# Patient Record
Sex: FEMALE | Race: BLACK OR AFRICAN AMERICAN | NOT HISPANIC OR LATINO | Employment: FULL TIME | URBAN - METROPOLITAN AREA
[De-identification: names, ages, dates, MRNs, and addresses within clinical notes are randomized per-mention and may not be internally consistent; named-entity substitution may affect disease eponyms.]

---

## 2023-08-21 LAB
HUMAN PAPILLOMAVIRUS (HPV): NORMAL
PAP RECOMMENDATION EXT: NORMAL

## 2023-11-28 ENCOUNTER — OFFICE VISIT (OUTPATIENT)
Dept: URGENT CARE | Facility: CLINIC | Age: 43
End: 2023-11-28
Payer: COMMERCIAL

## 2023-11-28 ENCOUNTER — HOSPITAL ENCOUNTER (OUTPATIENT)
Dept: RADIOLOGY | Facility: CLINIC | Age: 43
Discharge: HOME | End: 2023-11-28
Attending: NURSE PRACTITIONER
Payer: COMMERCIAL

## 2023-11-28 VITALS
HEART RATE: 80 BPM | TEMPERATURE: 98 F | DIASTOLIC BLOOD PRESSURE: 64 MMHG | HEIGHT: 64 IN | BODY MASS INDEX: 34.66 KG/M2 | WEIGHT: 203 LBS | SYSTOLIC BLOOD PRESSURE: 138 MMHG | OXYGEN SATURATION: 97 % | RESPIRATION RATE: 20 BRPM

## 2023-11-28 DIAGNOSIS — R52 PAIN: ICD-10-CM

## 2023-11-28 DIAGNOSIS — M23.92 ACUTE INTERNAL DERANGEMENT OF LEFT KNEE: Primary | ICD-10-CM

## 2023-11-28 DIAGNOSIS — M25.569 KNEE PAIN, UNSPECIFIED CHRONICITY, UNSPECIFIED LATERALITY: ICD-10-CM

## 2023-11-28 PROCEDURE — 99204 PR OFFICE/OUTPT VISIT, NEW, LEVL IV, 45-59 MIN: ICD-10-PCS | Mod: 25,S$GLB,, | Performed by: NURSE PRACTITIONER

## 2023-11-28 PROCEDURE — 96372 THER/PROPH/DIAG INJ SC/IM: CPT | Mod: S$GLB,,, | Performed by: NURSE PRACTITIONER

## 2023-11-28 PROCEDURE — 99204 OFFICE O/P NEW MOD 45 MIN: CPT | Mod: 25,S$GLB,, | Performed by: NURSE PRACTITIONER

## 2023-11-28 PROCEDURE — 96372 PR INJECTION,THERAP/PROPH/DIAG2ST, IM OR SUBCUT: ICD-10-PCS | Mod: S$GLB,,, | Performed by: NURSE PRACTITIONER

## 2023-11-28 RX ORDER — KETOROLAC TROMETHAMINE 30 MG/ML
30 INJECTION, SOLUTION INTRAMUSCULAR; INTRAVENOUS
Status: DISCONTINUED | OUTPATIENT
Start: 2023-11-28 | End: 2023-11-28

## 2023-11-28 RX ORDER — MONTELUKAST SODIUM 10 MG/1
10 TABLET ORAL
COMMUNITY

## 2023-11-28 RX ORDER — KETOROLAC TROMETHAMINE 30 MG/ML
30 INJECTION, SOLUTION INTRAMUSCULAR; INTRAVENOUS
Status: COMPLETED | OUTPATIENT
Start: 2023-11-28 | End: 2023-11-28

## 2023-11-28 RX ORDER — NAPROXEN 500 MG/1
500 TABLET ORAL 2 TIMES DAILY PRN
Qty: 20 TABLET | Refills: 0 | Status: SHIPPED | OUTPATIENT
Start: 2023-11-28 | End: 2023-12-08

## 2023-11-28 RX ADMIN — KETOROLAC TROMETHAMINE 30 MG: 30 INJECTION, SOLUTION INTRAMUSCULAR; INTRAVENOUS at 07:11

## 2023-11-28 NOTE — LETTER
November 28, 2023      [facility]  [address], [address]  [facility] [address]  Phone: [phone]  Fax: [phone]       Patient: Cindy Alan   YOB: 1980  Date of Visit: 11/28/2023    To Whom It May Concern:    Low Alan  was at [facility] on 11/28/2023. The patient may return to work/school on 11/30/2023 or 12/01/2023 with restrictions.  Avoid prolonged standing or sitting, or walking. If you have any questions or concerns, or if I can be of further assistance, please do not hesitate to contact me.    Sincerely,          Dave Iqbal, NP

## 2023-11-29 ENCOUNTER — HOSPITAL ENCOUNTER (OUTPATIENT)
Dept: RADIOLOGY | Facility: CLINIC | Age: 43
Discharge: HOME | End: 2023-11-29
Attending: NURSE PRACTITIONER
Payer: COMMERCIAL

## 2023-11-29 PROCEDURE — 73562 XR KNEE 3 VIEW LEFT: ICD-10-PCS | Mod: LT,S$GLB,, | Performed by: RADIOLOGY

## 2023-11-29 PROCEDURE — 73562 X-RAY EXAM OF KNEE 3: CPT | Mod: LT,S$GLB,, | Performed by: RADIOLOGY

## 2023-11-29 NOTE — PATIENT INSTRUCTIONS
Elevate as much as possible   ICE OR COOL PACK 20 minutes on and off as needed for pain   Tylenol  per pkg directions as needed pain   Naproxen every 12 hrs as needed for pain inflammation   Protective splint     Referral to local ortho for further evaluation [facility] can assist with appointment scheduling    [facility] Mon-Fri 8-5pm [phone]     Return for xray services when availabel

## 2023-11-29 NOTE — PROGRESS NOTES
"Subjective:      Patient ID: Cindy Alan is a 43 y.o. female.    Vitals:  height is 5' 4" (1.626 m) and weight is 92.1 kg (203 lb). Her temperature is 97.8 °F (36.6 °C). Her blood pressure is 138/64 and her pulse is 80. Her respiration is 20 and oxygen saturation is 97%.     Chief Complaint: Knee Pain    Patient presents with Left knee pain severe for the last 2 weeks. It has been painful for awhile.  She is unaware of an injury recently.      Knee Pain   The incident occurred more than 1 week ago. The incident occurred at home. There was no injury mechanism. The pain is at a severity of 6/10. The pain is moderate. The pain has been Constant since onset. Associated symptoms include an inability to bear weight and a loss of motion. Pertinent negatives include no loss of sensation, muscle weakness, numbness or tingling. She reports no foreign bodies present. The symptoms are aggravated by weight bearing. She has tried nothing for the symptoms. The treatment provided no relief.       Musculoskeletal:  Positive for joint pain, muscle cramps and muscle ache.   Neurological:  Negative for numbness.      Objective:     Vitals:    11/28/23 1807   BP: 138/64   BP Location: Right arm   Patient Position: Sitting   BP Method: Large (Automatic)   Pulse: 80   Resp: 20   Temp: 97.8 °F (36.6 °C)   SpO2: 97%   Weight: 92.1 kg (203 lb)   Height: 5' 4" (1.626 m)       Physical Exam   Constitutional: She is oriented to person, place, and time. She appears well-developed. She is cooperative.   HENT:   Head: Normocephalic and atraumatic.   Ears:   Right Ear: Hearing and external ear normal.   Left Ear: Hearing and external ear normal.   Nose: Nose normal. No mucosal edema or nasal deformity. No epistaxis. Right sinus exhibits no maxillary sinus tenderness and no frontal sinus tenderness. Left sinus exhibits no maxillary sinus tenderness and no frontal sinus tenderness.   Mouth/Throat: Uvula is midline, oropharynx is clear and moist " and mucous membranes are normal. No trismus in the jaw. Normal dentition. No uvula swelling.   Eyes: Conjunctivae and lids are normal.   Neck: Trachea normal and phonation normal. Neck supple.   Cardiovascular: Normal rate, regular rhythm, normal heart sounds and normal pulses.   Pulmonary/Chest: Effort normal and breath sounds normal.   Abdominal: Normal appearance and bowel sounds are normal. Soft.   Musculoskeletal:         General: Signs of injury present.      Comments: Left knee + anterior infrapatellar knee pain with extension and flexion    Neurological: She is alert and oriented to person, place, and time. She exhibits normal muscle tone.   Skin: Skin is warm, dry and intact.   Psychiatric: Her speech is normal and behavior is normal. Judgment and thought content normal.   Nursing note and vitals reviewed.      Assessment:     1. Acute internal derangement of left knee    2. Pain    3. Knee pain, unspecified chronicity, unspecified laterality    XR KNEE 3 VIEW LEFT    Result Date: 11/29/2023  EXAM:  XR KNEE 3 VIEW LEFT CLINICAL HISTORY:    Left knee joint pain TECHNIQUE: 3 views of the left knee. COMPARISON: None. FINDINGS:    Bone density and architecture are normal. No acute findings.      Negative study Finalized on: 11/29/2023 2:04 PM By:  Clayton Lynch MD [facility]# [id number]      2023-11-29 14:06:52.948    [facility]     Plan:     Patient stable for discharge and home management of condition    DME applied by RT Crawford; neurovascular reassessment completed by me post application and noted nv intact      Acute internal derangement of left knee  -     KNEE BRACE FOR HOME USE  -     Discontinue: ketorolac injection 30 mg  -     ketorolac injection 30 mg  -     naproxen (NAPROSYN) 500 MG tablet; Take 1 tablet (500 mg total) by mouth 2 (two) times daily as needed (pain inflammation).  Dispense: 20 tablet; Refill: 0  -     Ambulatory referral/consult to Orthopedics    Pain  -     XR KNEE 3 VIEW LEFT; Future;  Expected date: 11/28/2023  -     Discontinue: ketorolac injection 30 mg  -     ketorolac injection 30 mg  -     naproxen (NAPROSYN) 500 MG tablet; Take 1 tablet (500 mg total) by mouth 2 (two) times daily as needed (pain inflammation).  Dispense: 20 tablet; Refill: 0  -     Ambulatory referral/consult to Orthopedics    Knee pain, unspecified chronicity, unspecified laterality  -     IMMOBILIZER FOR HOME USE      Patient Instructions   Elevate as much as possible   ICE OR COOL PACK 20 minutes on and off as needed for pain   Tylenol  per pkg directions as needed pain   Naproxen every 12 hrs as needed for pain inflammation   Protective splint     Referral to local ortho for further evaluation [facility] can assist with appointment scheduling    [facility] Mon-Fri 8-5pm [phone]

## 2023-11-29 NOTE — PROGRESS NOTES
11/29/23     Patient is 42 yo female who was seen here yesterday and advised to return today for an xray.     X-ray is negative for acute fracture or dislocation.     Patient reports no specific injury.     Exam of knee: no swelling. ROM limited by pain. No crepitus.   Patient placed in knee brace yesterday. When she arrived today it was not placed properly and did not fit her knee well. Staff changed knee brace to a knee immobilizer for improved fit.     Discussed treatment plan. She is to follow up as ordered with ortho. Treat pain with ibuprofen/tylenol. Return for any new or worsening symptoms.     Completed medical consideration form for Employee health. Patient is to return to work on 11/30/23. No restrictions advised. I did request patient be allow to limit standing/sitting so she is able to move and stretch her leg every 2 hours. Lacie Bowles, FE

## 2023-12-15 ENCOUNTER — HOSPITAL ENCOUNTER (EMERGENCY)
Facility: HOSPITAL | Age: 43
Discharge: HOME | End: 2023-12-15
Attending: EMERGENCY MEDICINE
Payer: COMMERCIAL

## 2023-12-15 VITALS
DIASTOLIC BLOOD PRESSURE: 75 MMHG | OXYGEN SATURATION: 100 % | BODY MASS INDEX: 34.84 KG/M2 | SYSTOLIC BLOOD PRESSURE: 132 MMHG | RESPIRATION RATE: 18 BRPM | HEART RATE: 88 BPM | WEIGHT: 203 LBS | TEMPERATURE: 98 F

## 2023-12-15 DIAGNOSIS — R10.13 EPIGASTRIC PAIN: Primary | ICD-10-CM

## 2023-12-15 LAB
ALBUMIN SERPL BCP-MCNC: 3.9 G/DL (ref 3.5–5.2)
ALP SERPL-CCNC: 76 U/L (ref 55–135)
ALT SERPL W/O P-5'-P-CCNC: 12 U/L (ref 10–44)
ANION GAP SERPL CALC-SCNC: 10 MMOL/L (ref 8–16)
AST SERPL-CCNC: 12 U/L (ref 10–40)
B-HCG UR QL: NEGATIVE
BASOPHILS # BLD AUTO: 0.03 K/UL (ref 0–0.2)
BASOPHILS NFR BLD: 0.4 % (ref 0–1.9)
BILIRUB SERPL-MCNC: 0.4 MG/DL (ref 0.1–1)
BILIRUB UR QL STRIP: NEGATIVE
BNP SERPL-MCNC: <10 PG/ML (ref 0–99)
BUN SERPL-MCNC: 9 MG/DL (ref 6–20)
CALCIUM SERPL-MCNC: 9.2 MG/DL (ref 8.7–10.5)
CHLORIDE SERPL-SCNC: 106 MMOL/L (ref 95–110)
CLARITY UR: CLEAR
CO2 SERPL-SCNC: 23 MMOL/L (ref 23–29)
COLOR UR: COLORLESS
CREAT SERPL-MCNC: 1 MG/DL (ref 0.5–1.4)
DIFFERENTIAL METHOD: NORMAL
EOSINOPHIL # BLD AUTO: 0.2 K/UL (ref 0–0.5)
EOSINOPHIL NFR BLD: 2.7 % (ref 0–8)
ERYTHROCYTE [DISTWIDTH] IN BLOOD BY AUTOMATED COUNT: 12 % (ref 11.5–14.5)
EST. GFR  (NO RACE VARIABLE): >60 ML/MIN/1.73 M^2
GLUCOSE SERPL-MCNC: 118 MG/DL (ref 70–110)
GLUCOSE UR QL STRIP: NEGATIVE
HCT VFR BLD AUTO: 38.5 % (ref 37–48.5)
HGB BLD-MCNC: 12.7 G/DL (ref 12–16)
HGB UR QL STRIP: NEGATIVE
IMM GRANULOCYTES # BLD AUTO: 0.02 K/UL (ref 0–0.04)
IMM GRANULOCYTES NFR BLD AUTO: 0.2 % (ref 0–0.5)
KETONES UR QL STRIP: NEGATIVE
LEUKOCYTE ESTERASE UR QL STRIP: NEGATIVE
LIPASE SERPL-CCNC: 20 U/L (ref 4–60)
LYMPHOCYTES # BLD AUTO: 3.2 K/UL (ref 1–4.8)
LYMPHOCYTES NFR BLD: 37.3 % (ref 18–48)
MCH RBC QN AUTO: 28.7 PG (ref 27–31)
MCHC RBC AUTO-ENTMCNC: 33 G/DL (ref 32–36)
MCV RBC AUTO: 87 FL (ref 82–98)
MONOCYTES # BLD AUTO: 0.6 K/UL (ref 0.3–1)
MONOCYTES NFR BLD: 7.1 % (ref 4–15)
NEUTROPHILS # BLD AUTO: 4.5 K/UL (ref 1.8–7.7)
NEUTROPHILS NFR BLD: 52.3 % (ref 38–73)
NITRITE UR QL STRIP: NEGATIVE
NRBC BLD-RTO: 0 /100 WBC
PH UR STRIP: 8 [PH] (ref 5–8)
PLATELET # BLD AUTO: 225 K/UL (ref 150–450)
PMV BLD AUTO: 11.3 FL (ref 9.2–12.9)
POTASSIUM SERPL-SCNC: 4.1 MMOL/L (ref 3.5–5.1)
PROT SERPL-MCNC: 7.1 G/DL (ref 6–8.4)
PROT UR QL STRIP: NEGATIVE
RBC # BLD AUTO: 4.43 M/UL (ref 4–5.4)
SODIUM SERPL-SCNC: 139 MMOL/L (ref 136–145)
SP GR UR STRIP: 1.01 (ref 1–1.03)
TROPONIN I SERPL DL<=0.01 NG/ML-MCNC: <0.006 NG/ML (ref 0–0.03)
TROPONIN I SERPL DL<=0.01 NG/ML-MCNC: <0.006 NG/ML (ref 0–0.03)
URN SPEC COLLECT METH UR: ABNORMAL
UROBILINOGEN UR STRIP-ACNC: NEGATIVE EU/DL
WBC # BLD AUTO: 8.55 K/UL (ref 3.9–12.7)

## 2023-12-15 PROCEDURE — 81025 URINE PREGNANCY TEST: CPT | Performed by: EMERGENCY MEDICINE

## 2023-12-15 PROCEDURE — 83880 ASSAY OF NATRIURETIC PEPTIDE: CPT | Performed by: EMERGENCY MEDICINE

## 2023-12-15 PROCEDURE — 84484 ASSAY OF TROPONIN QUANT: CPT | Mod: 91 | Performed by: EMERGENCY MEDICINE

## 2023-12-15 PROCEDURE — 93010 EKG 12-LEAD: ICD-10-PCS | Mod: ,,, | Performed by: INTERNAL MEDICINE

## 2023-12-15 PROCEDURE — 80053 COMPREHEN METABOLIC PANEL: CPT | Performed by: EMERGENCY MEDICINE

## 2023-12-15 PROCEDURE — 81003 URINALYSIS AUTO W/O SCOPE: CPT | Performed by: EMERGENCY MEDICINE

## 2023-12-15 PROCEDURE — 93010 ELECTROCARDIOGRAM REPORT: CPT | Mod: ,,, | Performed by: INTERNAL MEDICINE

## 2023-12-15 PROCEDURE — 93005 ELECTROCARDIOGRAM TRACING: CPT

## 2023-12-15 PROCEDURE — 85025 COMPLETE CBC W/AUTO DIFF WBC: CPT | Performed by: EMERGENCY MEDICINE

## 2023-12-15 PROCEDURE — 99285 EMERGENCY DEPT VISIT HI MDM: CPT | Mod: 25

## 2023-12-15 PROCEDURE — 83690 ASSAY OF LIPASE: CPT | Performed by: EMERGENCY MEDICINE

## 2023-12-15 NOTE — ED PROVIDER NOTES
Encounter Date: 12/15/2023  SCRIBE #1 NOTE: I, Rama Palacios, am scribing for, and in the presence of, Farhat Roper DO. I have scribed the MDM.          History     Chief Complaint   Patient presents with    Dizziness     Dizziness, epigastric pain, sweating     Patient presents with epigastric abdominal pain along with that began 20 minutes prior to arrival but has resolved at this time.  She denies any chest pain/pressure/tightness.  She denies any radiating pain.  Patient denies any nausea / vomiting / diarrhea.  She denies any fevers/ chills.  She denies any cough or congestion besides what she usually experiences with her chronic rhinitis.        Review of patient's allergies indicates:   Allergen Reactions    Neomycin Swelling    Penicillin Swelling    Cefdinir      Other Reaction(s): ABDOMEN PAIN    Reports abdominal pain    Adhesive tape-silicones     Neomycin-polymyxin b-dexameth     Amoxicillin Rash    Azithromycin Rash    Latex, natural rubber Rash     Past Medical History:   Diagnosis Date    Asthma      No past surgical history on file.  No family history on file.  Social History     Tobacco Use    Smoking status: Never    Smokeless tobacco: Never   Substance Use Topics    Alcohol use: Not Currently    Drug use: Never     Review of Systems   Gastrointestinal:  Positive for abdominal pain.       Physical Exam     Initial Vitals   BP Pulse Resp Temp SpO2   12/15/23 0940 12/15/23 0940 12/15/23 0940 12/15/23 1102 12/15/23 0940   (!) 164/68 68 (!) 23 97.8 °F (36.6 °C) 100 %      MAP       --                Physical Exam    Vitals reviewed.  Constitutional: She appears well-developed.   Cardiovascular:  Normal rate and regular rhythm.           No murmur heard.  Pulmonary/Chest: Breath sounds normal. She has no wheezes.   Abdominal: Abdomen is soft.    There is no tenderness to palpation to all 4 quadrants of the abdomen no rigidity no distention   Musculoskeletal:         General: Normal range of  motion.     Neurological: She is alert and oriented to person, place, and time.   Skin: Skin is warm and dry. No rash noted.         ED Course   Procedures  Labs Reviewed   COMPREHENSIVE METABOLIC PANEL - Abnormal; Notable for the following components:       Result Value    Glucose 118 (*)     All other components within normal limits   URINALYSIS, REFLEX TO URINE CULTURE - Abnormal; Notable for the following components:    Color, UA Colorless (*)     All other components within normal limits    Narrative:     Specimen Source->Urine   CBC W/ AUTO DIFFERENTIAL   TROPONIN I   B-TYPE NATRIURETIC PEPTIDE   PREGNANCY TEST, URINE RAPID    Narrative:     Specimen Source->Urine   LIPASE   TROPONIN I     EKG Readings: (Independently Interpreted)    Sinus rhythm rate of 75, left axis, nonspecific T-wave abnormalities    EKG #2  shows normal sinus rhythm rate of 83, left axis, nonspecific T-wave abnormalities     ECG Results              EKG 12-lead (Final result)  Result time 12/15/23 16:56:21      Final result by Interface, Lab In [facility] (12/15/23 16:56:21)                   Narrative:    Test Reason : R10.13,    Vent. Rate : 083 BPM     Atrial Rate : 083 BPM     P-R Int : 170 ms          QRS Dur : 084 ms      QT Int : 352 ms       P-R-T Axes : 043 -04 011 degrees     QTc Int : 413 ms    Normal sinus rhythm  Normal ECG  When compared with ECG of 15-DEC-2023 10:29,  No significant change was found  Confirmed by ALEXIS ANDREW MD ([id number]) on 12/15/2023 4:56:16 PM    Referred By: [email or web address]   SELF           Confirmed By:ALEXIS ANDREW MD                                  Imaging Results              X-Ray Chest AP Portable (Final result)  Result time 12/15/23 11:10:47      Final result by Monroe Phillip MD (12/15/23 11:10:47)                   Impression:      No acute findings.      Electronically signed by: Monroe Phillip MD  Date:    12/15/2023  Time:    11:10               Narrative:    EXAMINATION:  XR CHEST AP PORTABLE    CLINICAL  HISTORY:  epigastric pain;    TECHNIQUE:  Single frontal view of the chest was performed.    COMPARISON:  None    FINDINGS:  The cardiomediastinal silhouette is normal.    The lungs are clear.  No pleural effusions.    No acute osseous findings.  No advanced arthritic changes.                                           Medications - No data to display  Medical Decision Making  Patient has 2 EKGs that show no acute signs of ischemia along with 2- troponins.  Her symptoms are completely resolved.  She is able to tolerate fluids.  She is able to ambulate around the department with no feelings of dizziness or unsteadiness on her feet.  She is instructed to return immediately for any new or worsening symptoms and she verbalized understanding.    Amount and/or Complexity of Data Reviewed  Labs: ordered. Decision-making details documented in ED Course.  Radiology: ordered. Decision-making details documented in ED Course.  ECG/medicine tests: ordered. Decision-making details documented in ED Course.  Discussion of management or test interpretation with external provider(s): 2:18 PM: Reassessed pt at this time. Discussed with pt all pertinent ED information and results. Pt's EKG is unchanged and troponin is negative. Discussed pt dx and plan of tx. Gave pt all f/u and return to the ED instructions. All questions and concerns were addressed at this time. Pt expresses understanding of information and instructions, and is comfortable with plan to discharge. Pt is stable for discharge.    I discussed with patient and/or family/caretaker that evaluation in the ED does not suggest any emergent or life threatening medical conditions requiring immediate intervention beyond what was provided in the ED, and I believe patient is safe for discharge.  Regardless, an unremarkable evaluation in the ED does not preclude the development or presence of a serious of life threatening condition. As such, patient was instructed to return  immediately for any worsening or change in current symptoms.        Risk  Prescription drug management.  Risk Details: Differential diagnosis includes the following but is not limited to: Dehydration, electrolyte abnormality, arrhythmia, infection, STEMI, NSTEMI, UTI, pancreatitis, cholecystitis, GERD, peptic ulcer disease, gastritis            Scribe Attestation:   Scribe #1: I performed the above scribed service and the documentation accurately describes the services I performed. I attest to the accuracy of the note.    Attending Attestation:           Physician Attestation for Scribe:  Physician Attestation Statement for Scribe #1: I, Farhat Roper DO, reviewed documentation, as scribed by Rama Palacios in my presence, and it is both accurate and complete.             ED Course as of 12/16/23 1020   Fri Dec 15, 2023   1057 CBC auto differential   Within normal limits [CD]   1115 Urinalysis, Reflex to Urine Culture Urine, Clean Catch(!)   No UTI [CD]   1115 Comprehensive metabolic panel(!)   hyperglycemia [CD]   1115 Troponin I #1   Within normal limits [CD]   1115 BNP   Within normal limits [CD]   1115 Lipase   Within normal limits [CD]   1122 X-Ray Chest AP Portable    No acute findings [CD]   1416 Troponin I  wnl [CD]      ED Course User Index  [CD] Farhat Roper DO                         Disposition: Discharged  Condition: Stable    Clinical Impression:  Final diagnoses:  [R10.13] Epigastric pain (Primary)          ED Disposition Condition    Discharge Stable          ED Prescriptions    None       Follow-up Information       Follow up With Specialties Details Why Contact Info    Jake Cedillo Jr., MD Family Medicine Schedule an appointment as soon as possible for a visit   [address] [place] [phone]               Farhat oRper DO  12/16/23 1023

## 2023-12-23 ENCOUNTER — OFFICE VISIT (OUTPATIENT)
Dept: URGENT CARE | Facility: CLINIC | Age: 43
End: 2023-12-23
Payer: COMMERCIAL

## 2023-12-23 VITALS
HEIGHT: 64 IN | RESPIRATION RATE: 18 BRPM | DIASTOLIC BLOOD PRESSURE: 85 MMHG | WEIGHT: 203 LBS | TEMPERATURE: 98 F | BODY MASS INDEX: 34.66 KG/M2 | HEART RATE: 92 BPM | SYSTOLIC BLOOD PRESSURE: 130 MMHG | OXYGEN SATURATION: 97 %

## 2023-12-23 DIAGNOSIS — J10.1 INFLUENZA B: ICD-10-CM

## 2023-12-23 DIAGNOSIS — R50.9 FEVER, UNSPECIFIED FEVER CAUSE: Primary | ICD-10-CM

## 2023-12-23 PROBLEM — I10 PRIMARY HYPERTENSION: Status: ACTIVE | Noted: 2023-08-23

## 2023-12-23 LAB
CTP QC/QA: YES
POC MOLECULAR INFLUENZA A AGN: NEGATIVE
POC MOLECULAR INFLUENZA B AGN: POSITIVE

## 2023-12-23 PROCEDURE — 87502 INFLUENZA DNA AMP PROBE: CPT | Mod: QW,S$GLB,,

## 2023-12-23 PROCEDURE — 87502 POCT INFLUENZA A/B MOLECULAR: ICD-10-PCS | Mod: QW,S$GLB,,

## 2023-12-23 PROCEDURE — 99213 OFFICE O/P EST LOW 20 MIN: CPT | Mod: S$GLB,,,

## 2023-12-23 PROCEDURE — 99213 PR OFFICE/OUTPT VISIT, EST, LEVL III, 20-29 MIN: ICD-10-PCS | Mod: S$GLB,,,

## 2023-12-23 RX ORDER — BENZONATATE 100 MG/1
100 CAPSULE ORAL 3 TIMES DAILY PRN
Qty: 30 CAPSULE | Refills: 0 | Status: SHIPPED | OUTPATIENT
Start: 2023-12-23 | End: 2024-01-02

## 2023-12-23 RX ORDER — AZELASTINE 1 MG/ML
1 SPRAY, METERED NASAL 2 TIMES DAILY
Qty: 30 ML | Refills: 0 | Status: SHIPPED | OUTPATIENT
Start: 2023-12-23 | End: 2024-12-22

## 2023-12-23 RX ORDER — AMLODIPINE BESYLATE 5 MG/1
5 TABLET ORAL
COMMUNITY

## 2023-12-23 NOTE — PROGRESS NOTES
"Subjective:      Patient ID: Cindy Alan is a 43 y.o. female.    Vitals:  height is 5' 4" (1.626 m) and weight is 92.1 kg (203 lb). Her oral temperature is 98.2 °F (36.8 °C). Her blood pressure is 130/85 and her pulse is 92. Her respiration is 18 and oxygen saturation is 97%.     Chief Complaint: Cough    42yo female patient presents to the clinic with cough, sinus pressure, and fever. Pt report symptom onset 3 days ago. Patient reports she had a 101 temp last night. Patient has taken allegra, otc oral decongestant, singular, Flonase  .     Cough  This is a new problem. The current episode started in the past 7 days. The cough is Productive of sputum. Associated symptoms include a fever and rhinorrhea. Pertinent negatives include no chest pain, chills, ear pain, sore throat, shortness of breath or wheezing.       Constitution: Positive for fever. Negative for chills.   HENT:  Positive for congestion and sinus pressure. Negative for ear pain and sore throat.    Cardiovascular:  Negative for chest pain.   Respiratory:  Positive for cough. Negative for shortness of breath and wheezing.    Neurological:  Negative for dizziness and light-headedness.      Objective:     Physical Exam   Constitutional: She is oriented to person, place, and time. She appears well-developed. She is cooperative.  Non-toxic appearance. She does not appear ill. No distress.   HENT:   Head: Normocephalic and atraumatic.   Ears:   Right Ear: Hearing, tympanic membrane, external ear and ear canal normal.   Left Ear: Hearing, tympanic membrane, external ear and ear canal normal.   Nose: Congestion present. No mucosal edema or rhinorrhea. Right sinus exhibits no maxillary sinus tenderness and no frontal sinus tenderness. Left sinus exhibits no maxillary sinus tenderness and no frontal sinus tenderness.   Mouth/Throat: Uvula is midline and mucous membranes are normal. No trismus in the jaw. No uvula swelling. No oropharyngeal exudate, posterior " oropharyngeal edema or posterior oropharyngeal erythema.   Eyes: Lids are normal.   Neck: Trachea normal and phonation normal. Neck supple. No edema present. No erythema present.   Cardiovascular: Normal rate, regular rhythm, normal heart sounds and normal pulses.   No murmur heard.  Pulmonary/Chest: Effort normal and breath sounds normal. No respiratory distress. She has no decreased breath sounds. She has no wheezes. She has no rhonchi.   Abdominal: Normal appearance.   Musculoskeletal: Normal range of motion.         General: Normal range of motion.   Lymphadenopathy:     She has no cervical adenopathy.   Neurological: She is alert and oriented to person, place, and time. She exhibits normal muscle tone.   Skin: Skin is warm, dry, intact and not diaphoretic.   Psychiatric: Her speech is normal and behavior is normal.   Nursing note and vitals reviewed.    Results for orders placed or performed in visit on 12/23/23   POCT Influenza A/B MOLECULAR   Result Value Ref Range    POC Molecular Influenza A Ag Negative Negative, Not Reported    POC Molecular Influenza B Ag Positive (A) Negative, Not Reported     Acceptable Yes          Assessment:     1. Fever, unspecified fever cause    2. Influenza B        Plan:       Fever, unspecified fever cause  -     POCT Influenza A/B MOLECULAR  -     benzonatate (TESSALON) 100 MG capsule; Take 1 capsule (100 mg total) by mouth 3 (three) times daily as needed for Cough.  Dispense: 30 capsule; Refill: 0  -     azelastine (ASTELIN) 137 mcg (0.1 %) nasal spray; 1 spray (137 mcg total) by Nasal route 2 (two) times daily.  Dispense: 30 mL; Refill: 0    Influenza B    Patient in no acute distress. Vitals reassuring. Non-toxic appearing. Reviewed positive flu B test results in detail. Discussed OTC medications for symptom relief. Discussed pt out of timeframe for antivirals. Discussed prescription for Tessalon Perles and astelin. Discussed the importance of further  evaluation if symptoms worsen. Patient stated verbal understanding.    Patient Instructions   Tested positive for Influenza B today. Take Tylenol/ibuprofen with food for fevers/pain. Drink plenty of fluids and get plenty of rest. Do not return to school/work until 24 hour fever free without the use of tylenol/ibuprofen. If your symptoms worsen please return or go to the ER for further evaluation.       Patient Instructions   PLEASE READ YOUR DISCHARGE INSTRUCTIONS ENTIRELY AS IT CONTAINS IMPORTANT INFORMATION.     Please drink plenty of fluids.     Please get plenty of rest.     Please return here or go to the Emergency Department for any concerns or worsening of condition.     Please take an over the counter antihistamine medication (allegra/Claritin/Zyrtec) of your choice as directed.       If you do have Hypertension or palpitations, it is safe to take Coricidin HBP for relief of sinus symptoms.     If not allergic, please take over the counter Tylenol (Acetaminophen) and/or Motrin (Ibuprofen) as directed for control of pain and/or fever.  Please follow up with your primary care doctor or specialist as needed.     Sore throat recommendations: Warm fluids, warm salt water gargles, throat lozenges, tea, honey, soup, rest, hydration.     Use over the counter flonase: one spray each nostril twice daily OR two sprays each nostril once daily.      If you  smoke, please stop smoking.     Please return or see your primary care doctor if you develop new or worsening symptoms.      Please arrange follow up with your primary medical clinic as soon as possible. You must understand that you've received an Urgent Care treatment only and that you may be released before all of your medical problems are known or treated. You, the patient, will arrange for follow up as instructed. If your symptoms worsen or fail to improve you should go to the Emergency Room.

## 2023-12-23 NOTE — PATIENT INSTRUCTIONS
Tested positive for Influenza B today. Take Tylenol/ibuprofen with food for fevers/pain. Drink plenty of fluids and get plenty of rest. Do not return to school/work until 24 hour fever free without the use of tylenol/ibuprofen. If your symptoms worsen please return or go to the ER for further evaluation.       Patient Instructions   PLEASE READ YOUR DISCHARGE INSTRUCTIONS ENTIRELY AS IT CONTAINS IMPORTANT INFORMATION.     Please drink plenty of fluids.     Please get plenty of rest.     Please return here or go to the Emergency Department for any concerns or worsening of condition.     Please take an over the counter antihistamine medication (allegra/Claritin/Zyrtec) of your choice as directed.       If you do have Hypertension or palpitations, it is safe to take Coricidin HBP for relief of sinus symptoms.     If not allergic, please take over the counter Tylenol (Acetaminophen) and/or Motrin (Ibuprofen) as directed for control of pain and/or fever.  Please follow up with your primary care doctor or specialist as needed.     Sore throat recommendations: Warm fluids, warm salt water gargles, throat lozenges, tea, honey, soup, rest, hydration.     Use over the counter flonase: one spray each nostril twice daily OR two sprays each nostril once daily.      If you  smoke, please stop smoking.     Please return or see your primary care doctor if you develop new or worsening symptoms.      Please arrange follow up with your primary medical clinic as soon as possible. You must understand that you've received an Urgent Care treatment only and that you may be released before all of your medical problems are known or treated. You, the patient, will arrange for follow up as instructed. If your symptoms worsen or fail to improve you should go to the Emergency Room.

## 2024-05-03 ENCOUNTER — TELEPHONE (OUTPATIENT)
Dept: INTERNAL MEDICINE | Facility: CLINIC | Age: 44
End: 2024-05-03
Payer: COMMERCIAL

## 2024-05-03 NOTE — TELEPHONE ENCOUNTER
----- Message from Chandrika Barry sent at 5/3/2024  9:30 AM CDT -----  Regarding: establish care  Name of Who is Calling:  Pt called         What is the request in detail:  This pt would like to be seen in this clinic if possible. Please advise         Can the clinic reply by [facility]:    no      What Number to [facility] Back [facility] Telephone Information:  [address]          [phone]

## 2024-05-06 ENCOUNTER — HOSPITAL ENCOUNTER (OUTPATIENT)
Dept: RADIOLOGY | Facility: HOSPITAL | Age: 44
Discharge: HOME | End: 2024-05-06
Attending: FAMILY MEDICINE
Payer: COMMERCIAL

## 2024-05-06 ENCOUNTER — OFFICE VISIT (OUTPATIENT)
Dept: INTERNAL MEDICINE | Facility: CLINIC | Age: 44
End: 2024-05-06
Payer: COMMERCIAL

## 2024-05-06 VITALS
HEART RATE: 89 BPM | TEMPERATURE: 99 F | OXYGEN SATURATION: 97 % | DIASTOLIC BLOOD PRESSURE: 78 MMHG | BODY MASS INDEX: 34.52 KG/M2 | HEIGHT: 64 IN | SYSTOLIC BLOOD PRESSURE: 110 MMHG | WEIGHT: 202.19 LBS

## 2024-05-06 DIAGNOSIS — M25.562 CHRONIC PAIN OF LEFT KNEE: Primary | ICD-10-CM

## 2024-05-06 DIAGNOSIS — M54.50 LUMBAR BACK PAIN: ICD-10-CM

## 2024-05-06 DIAGNOSIS — G89.29 CHRONIC PAIN OF LEFT KNEE: Primary | ICD-10-CM

## 2024-05-06 DIAGNOSIS — M23.52 RECURRENT LEFT KNEE INSTABILITY: ICD-10-CM

## 2024-05-06 LAB
BACTERIA #/AREA URNS AUTO: NORMAL /HPF
BILIRUB UR QL STRIP: NEGATIVE
CLARITY UR REFRACT.AUTO: CLEAR
COLOR UR AUTO: YELLOW
GLUCOSE UR QL STRIP: NEGATIVE
HGB UR QL STRIP: ABNORMAL
KETONES UR QL STRIP: NEGATIVE
LEUKOCYTE ESTERASE UR QL STRIP: ABNORMAL
MICROSCOPIC COMMENT: NORMAL
NITRITE UR QL STRIP: NEGATIVE
PH UR STRIP: 6 [PH] (ref 5–8)
PROT UR QL STRIP: NEGATIVE
RBC #/AREA URNS AUTO: 1 /HPF (ref 0–4)
SP GR UR STRIP: 1.02 (ref 1–1.03)
SQUAMOUS #/AREA URNS AUTO: 2 /HPF
URN SPEC COLLECT METH UR: ABNORMAL
WBC #/AREA URNS AUTO: 4 /HPF (ref 0–5)

## 2024-05-06 PROCEDURE — 81001 URINALYSIS AUTO W/SCOPE: CPT | Performed by: FAMILY MEDICINE

## 2024-05-06 PROCEDURE — 99999 PR PBB SHADOW E&M-EST. PATIENT-LVL V: CPT | Mod: PBBFAC,,, | Performed by: FAMILY MEDICINE

## 2024-05-06 PROCEDURE — 72110 X-RAY EXAM L-2 SPINE 4/>VWS: CPT | Mod: 26,,, | Performed by: RADIOLOGY

## 2024-05-06 PROCEDURE — 3078F DIAST BP <80 MM HG: CPT | Mod: CPTII,S$GLB,, | Performed by: FAMILY MEDICINE

## 2024-05-06 PROCEDURE — 1159F MED LIST DOCD IN RCRD: CPT | Mod: CPTII,S$GLB,, | Performed by: FAMILY MEDICINE

## 2024-05-06 PROCEDURE — 3008F BODY MASS INDEX DOCD: CPT | Mod: CPTII,S$GLB,, | Performed by: FAMILY MEDICINE

## 2024-05-06 PROCEDURE — 72110 X-RAY EXAM L-2 SPINE 4/>VWS: CPT | Mod: TC,PO

## 2024-05-06 PROCEDURE — 3074F SYST BP LT 130 MM HG: CPT | Mod: CPTII,S$GLB,, | Performed by: FAMILY MEDICINE

## 2024-05-06 PROCEDURE — 99204 OFFICE O/P NEW MOD 45 MIN: CPT | Mod: S$GLB,,, | Performed by: FAMILY MEDICINE

## 2024-05-06 PROCEDURE — 87086 URINE CULTURE/COLONY COUNT: CPT | Performed by: FAMILY MEDICINE

## 2024-05-06 RX ORDER — MELOXICAM 15 MG/1
15 TABLET ORAL DAILY
Qty: 30 TABLET | Refills: 1 | Status: SHIPPED | OUTPATIENT
Start: 2024-05-06

## 2024-05-06 RX ORDER — CYCLOBENZAPRINE HCL 10 MG
10 TABLET ORAL 3 TIMES DAILY PRN
Qty: 30 TABLET | Refills: 1 | Status: SHIPPED | OUTPATIENT
Start: 2024-05-06 | End: 2024-05-26

## 2024-05-08 ENCOUNTER — PATIENT MESSAGE (OUTPATIENT)
Dept: INTERNAL MEDICINE | Facility: CLINIC | Age: 44
End: 2024-05-08
Payer: COMMERCIAL

## 2024-05-08 LAB
BACTERIA UR CULT: NORMAL
BACTERIA UR CULT: NORMAL

## 2024-05-08 NOTE — PROGRESS NOTES
Subjective:      Patient ID: Cindy Alan is a 44 y.o. female.    Chief Complaint: Back Pain (2 weeks ), Knee Pain, and Establish Care      Patient reports chronic pain in left leg, has had this for some time now, pain is constant but worse at times.  She reports pain is worse when the knee is bent, improved with straightening of the knee.  Also has pain in her lower back about 2 weeks now, has had intermittent back pains for sometimes but the last 2 weeks has been constant, was trying to carry a flat of water which made the pain worse.  No direct trauma to the area.  Pain worst in the left lower side, no radiation.  She has been taking ibuprofen with some benefit for both problems    Back Pain    Knee Pain       Review of Systems   Musculoskeletal:  Positive for arthralgias, back pain and joint swelling.     Past Medical History:   Diagnosis Date    Asthma     Essential (primary) hypertension           Past Surgical History:   Procedure Laterality Date    DNC       Family History   Problem Relation Name Age of Onset    Kidney disease Mother      Diabetes Mother      Hypertension Mother      Thyroiditis Mother      Cataracts Mother       Social History     Socioeconomic History    Marital status: Other   Tobacco Use    Smoking status: Never     Passive exposure: Never    Smokeless tobacco: Never   Substance and Sexual Activity    Alcohol use: Not Currently    Drug use: Never    Sexual activity: Yes     Partners: Male     Social Determinants of Health     Financial Resource Strain: Low Risk  (5/3/2024)    Overall Financial Resource Strain (CARDIA)     Difficulty of Paying Living Expenses: Not very hard   Food Insecurity: No Food Insecurity (5/3/2024)    Hunger Vital Sign     Worried About Running Out of Food in the Last Year: Never true     Ran Out of Food in the Last Year: Never true   Transportation Needs: No Transportation Needs (5/3/2024)    PRAPARE - Transportation     Lack of Transportation (Medical): No  "    Lack of Transportation (Non-Medical): No   Physical Activity: Sufficiently Active (5/3/2024)    Exercise Vital Sign     Days of Exercise per Week: 7 days     Minutes of Exercise per Session: 30 min   Stress: No Stress Concern Present (5/3/2024)    [employer] [place] [employer] - Occupational Stress Questionnaire     Feeling of Stress : Not at all   Housing Stability: Unknown (5/3/2024)    Housing Stability Vital Sign     Unable to Pay for Housing in the Last Year: No     Review of patient's allergies indicates:   Allergen Reactions    Neomycin Swelling    Penicillin Swelling    Cefdinir      Other Reaction(s): ABDOMEN PAIN    Reports abdominal pain    Adhesive tape-silicones     Neomycin-polymyxin b-dexameth      Eye cream    Amoxicillin Rash    Azithromycin Rash    Latex, natural rubber Rash       Objective:       /78 (BP Location: Left arm, Patient Position: Sitting, BP Method: Large (Manual))   Pulse 89   Temp 98.7 °F (37.1 °C) (Tympanic)   Ht 5' 4" (1.626 m)   Wt 91.7 kg (202 lb 2.6 oz)   LMP 04/23/2024   SpO2 97%   BMI 34.70 kg/m²   Physical Exam  Constitutional:       General: She is not in acute distress.     Appearance: Normal appearance. She is well-developed. She is not ill-appearing or diaphoretic.   Cardiovascular:      Rate and Rhythm: Normal rate and regular rhythm.      Heart sounds: Normal heart sounds.   Pulmonary:      Effort: Pulmonary effort is normal.      Breath sounds: Normal breath sounds.   Musculoskeletal:         General: Swelling present. No deformity. Normal range of motion.      Right lower leg: No edema.      Left lower leg: No edema.      Comments: Left lower lumbar paraspinal tenderness to palpation  Left knee with mild generalized tenderness, swelling   Neurological:      Mental Status: She is alert and oriented to person, place, and time.   Psychiatric:         Mood and Affect: Mood normal.         Behavior: Behavior normal.         Thought Content: " Thought content normal.         Judgment: Judgment normal.       Assessment:     1. Chronic pain of left knee    2. Lumbar back pain    3. Recurrent left knee instability      Plan:   Chronic pain of left knee  -     MRI Knee Without Contrast Left; Future; Expected date: 05/06/2024    Lumbar back pain  -     X-Ray Lumbar Spine 5 View; Future; Expected date: 05/06/2024  -     Ambulatory referral/consult to Physical/Occupational Therapy; Future; Expected date: 05/13/2024  -     Urine culture; Future; Expected date: 05/06/2024  -     Urinalysis; Future; Expected date: 05/06/2024    Recurrent left knee instability  -     MRI Knee Without Contrast Left; Future; Expected date: 05/06/2024    Other orders  -     meloxicam (MOBIC) 15 MG tablet; Take 1 tablet (15 mg total) by mouth once daily. Take with meal.  Dispense: 30 tablet; Refill: 1  -     cyclobenzaprine (FLEXERIL) 10 MG tablet; Take 1 tablet (10 mg total) by mouth 3 (three) times daily as needed for Muscle spasms.  Dispense: 30 tablet; Refill: 1  -     Urinalysis Microscopic      Medication List with Changes/Refills   New Medications    CYCLOBENZAPRINE (FLEXERIL) 10 MG TABLET    Take 1 tablet (10 mg total) by mouth 3 (three) times daily as needed for Muscle spasms.    MELOXICAM (MOBIC) 15 MG TABLET    Take 1 tablet (15 mg total) by mouth once daily. Take with meal.   Current Medications    AMLODIPINE (NORVASC) 2.5 MG TABLET    Take 2.5 mg by mouth once daily.    AZELASTINE (ASTELIN) 137 MCG (0.1 %) NASAL SPRAY    1 spray (137 mcg total) by Nasal route 2 (two) times daily.    FEXOFENADINE (ALLEGRA) 180 MG TABLET    Take 180 mg by mouth once daily.    MONTELUKAST (SINGULAIR) 10 MG TABLET    Take 10 mg by mouth.

## 2024-05-09 ENCOUNTER — PATIENT MESSAGE (OUTPATIENT)
Dept: INTERNAL MEDICINE | Facility: CLINIC | Age: 44
End: 2024-05-09
Payer: COMMERCIAL

## 2024-05-09 ENCOUNTER — HOSPITAL ENCOUNTER (OUTPATIENT)
Dept: RADIOLOGY | Facility: HOSPITAL | Age: 44
Discharge: HOME | End: 2024-05-09
Attending: FAMILY MEDICINE
Payer: COMMERCIAL

## 2024-05-09 DIAGNOSIS — M25.562 CHRONIC PAIN OF LEFT KNEE: ICD-10-CM

## 2024-05-09 DIAGNOSIS — M23.52 RECURRENT LEFT KNEE INSTABILITY: ICD-10-CM

## 2024-05-09 DIAGNOSIS — G89.29 CHRONIC PAIN OF LEFT KNEE: ICD-10-CM

## 2024-05-09 PROCEDURE — 73721 MRI JNT OF LWR EXTRE W/O DYE: CPT | Mod: TC,LT

## 2024-05-09 PROCEDURE — 73721 MRI JNT OF LWR EXTRE W/O DYE: CPT | Mod: 26,LT,, | Performed by: RADIOLOGY

## 2024-05-14 ENCOUNTER — TELEPHONE (OUTPATIENT)
Dept: INTERNAL MEDICINE | Facility: CLINIC | Age: 44
End: 2024-05-14
Payer: COMMERCIAL

## 2024-05-14 NOTE — TELEPHONE ENCOUNTER
Patient would like a referral to cardiology for a 2nd opinion for high blood pressure. Please review and advise.

## 2024-05-14 NOTE — TELEPHONE ENCOUNTER
----- Message from Lexie Cortés sent at 5/14/2024 12:29 PM CDT -----  Contact: Patient, [phone]  Patient would like to get a referral.  Referral to what specialty:  Cardiology  Does the patient want the referral with a specific physician:  [facility]  [facility] the [facility]  Reason (be specific):  High blood pressure  Does the patient already have the specialty clinic appointment scheduled:  No  If yes, what date is the appointment scheduled:   N/A  Is the insurance listed in Epic correct? (this is important for a referral):  Yes  Advised patient that once provider approves this either a nurse or  will return their call?:   Would the patient like a call back, or a response through their [facility] portal?:   Call back  Comments:

## 2024-05-16 ENCOUNTER — OFFICE VISIT (OUTPATIENT)
Dept: INTERNAL MEDICINE | Facility: CLINIC | Age: 44
End: 2024-05-16
Payer: COMMERCIAL

## 2024-05-16 ENCOUNTER — PATIENT MESSAGE (OUTPATIENT)
Dept: INTERNAL MEDICINE | Facility: CLINIC | Age: 44
End: 2024-05-16

## 2024-05-16 VITALS
HEART RATE: 74 BPM | TEMPERATURE: 98 F | SYSTOLIC BLOOD PRESSURE: 128 MMHG | DIASTOLIC BLOOD PRESSURE: 86 MMHG | WEIGHT: 203.94 LBS | BODY MASS INDEX: 35 KG/M2 | OXYGEN SATURATION: 98 %

## 2024-05-16 DIAGNOSIS — I10 PRIMARY HYPERTENSION: ICD-10-CM

## 2024-05-16 DIAGNOSIS — B96.89 ACUTE BACTERIAL SINUSITIS: Primary | ICD-10-CM

## 2024-05-16 DIAGNOSIS — J01.90 ACUTE BACTERIAL SINUSITIS: Primary | ICD-10-CM

## 2024-05-16 DIAGNOSIS — E66.01 SEVERE OBESITY (BMI 35.0-39.9) WITH COMORBIDITY: ICD-10-CM

## 2024-05-16 DIAGNOSIS — G44.209 TENSION HEADACHE: ICD-10-CM

## 2024-05-16 PROCEDURE — 3008F BODY MASS INDEX DOCD: CPT | Mod: CPTII,S$GLB,, | Performed by: PHYSICIAN ASSISTANT

## 2024-05-16 PROCEDURE — 1159F MED LIST DOCD IN RCRD: CPT | Mod: CPTII,S$GLB,, | Performed by: PHYSICIAN ASSISTANT

## 2024-05-16 PROCEDURE — 3079F DIAST BP 80-89 MM HG: CPT | Mod: CPTII,S$GLB,, | Performed by: PHYSICIAN ASSISTANT

## 2024-05-16 PROCEDURE — 1160F RVW MEDS BY RX/DR IN RCRD: CPT | Mod: CPTII,S$GLB,, | Performed by: PHYSICIAN ASSISTANT

## 2024-05-16 PROCEDURE — 99999 PR PBB SHADOW E&M-EST. PATIENT-LVL IV: CPT | Mod: PBBFAC,,, | Performed by: PHYSICIAN ASSISTANT

## 2024-05-16 PROCEDURE — 99214 OFFICE O/P EST MOD 30 MIN: CPT | Mod: S$GLB,,, | Performed by: PHYSICIAN ASSISTANT

## 2024-05-16 PROCEDURE — 3074F SYST BP LT 130 MM HG: CPT | Mod: CPTII,S$GLB,, | Performed by: PHYSICIAN ASSISTANT

## 2024-05-16 RX ORDER — DOXYCYCLINE 100 MG/1
100 CAPSULE ORAL 2 TIMES DAILY
Qty: 20 CAPSULE | Refills: 0 | Status: SHIPPED | OUTPATIENT
Start: 2024-05-16 | End: 2024-05-26

## 2024-05-16 RX ORDER — HYDROCHLOROTHIAZIDE 12.5 MG/1
12.5 TABLET ORAL DAILY
Qty: 30 TABLET | Refills: 11 | Status: SHIPPED | OUTPATIENT
Start: 2024-05-16 | End: 2025-05-16

## 2024-05-16 RX ORDER — FLUTICASONE PROPIONATE 50 MCG
2 SPRAY, SUSPENSION (ML) NASAL DAILY
Qty: 16 G | Refills: 0 | Status: SHIPPED | OUTPATIENT
Start: 2024-05-16

## 2024-05-16 RX ORDER — PREDNISONE 10 MG/1
10 TABLET ORAL 2 TIMES DAILY
Qty: 10 TABLET | Refills: 0 | Status: SHIPPED | OUTPATIENT
Start: 2024-05-16 | End: 2024-05-21

## 2024-05-16 NOTE — ASSESSMENT & PLAN NOTE
Patient reports low BP readings even with amlodipine 2.5mg, 100s/80s, and groggy/tired feeling. She takes medication intermittently when she gets high readings. Stop amlodipine and start HCTZ 12.5mg.

## 2024-05-16 NOTE — ASSESSMENT & PLAN NOTE
Wt Readings from Last 10 Encounters:   05/16/24 92.5 kg (203 lb 14.8 oz)   05/06/24 91.7 kg (202 lb 2.6 oz)   04/11/24 91.6 kg (202 lb)   12/23/23 92.1 kg (203 lb)   12/15/23 92.1 kg (203 lb)   11/28/23 92.1 kg (203 lb)     Body mass index is 35 kg/m².  Weight stable

## 2024-05-16 NOTE — PROGRESS NOTES
Subjective:       Patient ID: Cindy Alan is a 44 y.o. female.    Chief Complaint: Sinus Problem (Patient stated that she has a lot of pressure behind her eyes.)      Sinus Problem  This is a new problem. The current episode started in the past 7 days. The problem has been gradually worsening since onset. There has been no fever. Associated symptoms include congestion, headaches, neck pain and sinus pressure. Pertinent negatives include no chills, coughing, ear pain or sore throat. Treatments tried: Allergra, Astelin and Coricidin. The treatment provided no relief.       Review of Systems   Constitutional:  Negative for chills and fever.   HENT:  Positive for congestion, postnasal drip, sinus pressure and sinus pain. Negative for ear pain and sore throat.    Respiratory:  Negative for cough.    Musculoskeletal:  Positive for myalgias and neck pain.   Neurological:  Positive for headaches. Negative for dizziness and light-headedness.       Objective:      Physical Exam  Vitals and nursing note reviewed.   Constitutional:       General: She is not in acute distress.     Appearance: She is well-developed.   HENT:      Head: Normocephalic and atraumatic.      Right Ear: Tympanic membrane, ear canal and external ear normal.      Left Ear: Tympanic membrane, ear canal and external ear normal.      Nose: No mucosal edema.      Comments: Erythematous and congested nasal mucosa noted     Mouth/Throat:      Lips: Pink.      Mouth: Mucous membranes are moist.      Pharynx: No posterior oropharyngeal erythema.      Comments: Clear PND noted to posterior pharynx  Eyes:      General: Lids are normal. No scleral icterus.     Extraocular Movements: Extraocular movements intact.      Conjunctiva/sclera: Conjunctivae normal.   Cardiovascular:      Rate and Rhythm: Normal rate and regular rhythm.   Pulmonary:      Effort: Pulmonary effort is normal.      Breath sounds: Normal breath sounds. No decreased breath sounds, wheezing,  rhonchi or rales.   Musculoskeletal:      Cervical back: No swelling, deformity, tenderness or bony tenderness. No pain with movement.   Neurological:      Mental Status: She is alert.      Cranial Nerves: No cranial nerve deficit.   Psychiatric:         Mood and Affect: Mood and affect normal.         Assessment:       1. Acute bacterial sinusitis    2. Tension headache    3. Primary hypertension    4. Severe obesity (BMI 35.0-39.9) with comorbidity        Plan:   1. Acute bacterial sinusitis  -     doxycycline (VIBRAMYCIN) 100 MG Cap; Take 1 capsule (100 mg total) by mouth 2 (two) times daily. for 10 days  Dispense: 20 capsule; Refill: 0  -     fluticasone propionate (FLONASE) 50 mcg/actuation nasal spray; 2 sprays (100 mcg total) by Each Nostril route once daily.  Dispense: 16 g; Refill: 0  -     predniSONE (DELTASONE) 10 MG tablet; Take 1 tablet (10 mg total) by mouth 2 (two) times daily. for 5 days  Dispense: 10 tablet; Refill: 0    2. Tension headache    3. Primary hypertension  Assessment & Plan:  Patient reports low BP readings even with amlodipine 2.5mg, 100s/80s, and groggy/tired feeling. She takes medication intermittently when she gets high readings. Stop amlodipine and start HCTZ 12.5mg.    Orders:  -     hydroCHLOROthiazide (HYDRODIURIL) 12.5 MG Tab; Take 1 tablet (12.5 mg total) by mouth once daily.  Dispense: 30 tablet; Refill: 11    4. Severe obesity (BMI 35.0-39.9) with comorbidity  Assessment & Plan:  Wt Readings from Last 10 Encounters:   05/16/24 92.5 kg (203 lb 14.8 oz)   05/06/24 91.7 kg (202 lb 2.6 oz)   04/11/24 91.6 kg (202 lb)   12/23/23 92.1 kg (203 lb)   12/15/23 92.1 kg (203 lb)   11/28/23 92.1 kg (203 lb)     Body mass index is 35 kg/m².  Weight stable           upper/lower

## 2024-05-17 ENCOUNTER — CLINICAL SUPPORT (OUTPATIENT)
Dept: REHABILITATION | Facility: HOSPITAL | Age: 44
End: 2024-05-17
Payer: COMMERCIAL

## 2024-05-17 DIAGNOSIS — M54.50 LUMBAR BACK PAIN: ICD-10-CM

## 2024-05-17 DIAGNOSIS — M62.81 WEAKNESS OF TRUNK MUSCULATURE: Primary | ICD-10-CM

## 2024-05-17 PROCEDURE — 97161 PT EVAL LOW COMPLEX 20 MIN: CPT | Mod: PN

## 2024-05-17 PROCEDURE — 97530 THERAPEUTIC ACTIVITIES: CPT | Mod: PN

## 2024-05-17 PROCEDURE — 97112 NEUROMUSCULAR REEDUCATION: CPT | Mod: PN

## 2024-05-17 NOTE — PLAN OF CARE
"[facility]   Physical Therapy Initial Evaluation      Name: Cindy Alan  Clinic Number: [id number]    Therapy Diagnosis:   Encounter Diagnoses   Name Primary?    Lumbar back pain     Weakness of trunk musculature Yes        Physician: Jose Mcdonnell MD    Physician Orders: PT Eval and Treat   Medical Diagnosis from Referral:  Lumbar back pain [M54.50]   Evaluation Date: 5/17/2024  Authorization Period Expiration: 5/6/2025   Plan of Care Expiration: 6/28/2024  Progress Note Due: 6/17/2024  Visit # / Visits authorized: 1/ 1   FOTO: 1/3    Precautions: Standard     Time In: 3:05 pm   Time Out: 4:00 pm   Total Appointment Time (timed & untimed codes): 55 minutes    Subjective     Date of onset: chronic with most recent flare up about 3 weeks ago    History of current condition - Cindy reports: come and go, chronic, midline low back pain. Patient states pain has been present for years now on and off, but flare ups are now becoming more frequent.  About three weeks ago, patient lifted a big case of water with other groceries balanced on top and pain onset once again. Patient states this caused significant pain down to her tail bone. Patient reports and improvement in this pain in time since, but she still gets significant tightness in her back. Patient denies any symptoms into lower extremities, but notes she has had nerve pain in relation to back issues after her last pregnancy, years ago.    Imaging: Lumbar x-rays May 2024: "No fracture or other acute disease is seen in the lumbar spine."    Social History:  lives with their family  Occupation: desk work for [employer]   Prior Level of Function: Independent and pain free with all activities of daily living   Current Level of Function: Independent though pain limited with prolonged sitting for work, and lifting for household activities of daily living, requiring modifications of routine     Pain:  Current 4/10, worst 10/10, best " 0/10   Location: midline low back   Description: Grabbing and Tight  Aggravating Factors: lifting ,morning, prolonged sitting   Easing Factors: NSAIDS, muscle relaxers, streching     Patients goals: to decrease pain levels and improved functional tolerance     Medical History:   Past Medical History:   Diagnosis Date    Asthma     Essential (primary) hypertension        Surgical History:   Cindy Alan  has a past surgical history that includes DNC.    Medications:   Cindy has a current medication list which includes the following prescription(s): azelastine, cyclobenzaprine, doxycycline, fexofenadine, fluticasone propionate, hydrochlorothiazide, meloxicam, montelukast, and prednisone.    Allergies:   Review of patient's allergies indicates:   Allergen Reactions    Neomycin Swelling    Penicillin Swelling    Cefdinir      Other Reaction(s): ABDOMEN PAIN    Reports abdominal pain    Adhesive tape-silicones     Neomycin-polymyxin b-dexameth      Eye cream    Amoxicillin Rash    Azithromycin Rash    Latex, natural rubber Rash        Objective        RANGE OF MOTION:    Lumbar AROM  (% of normal motion present)  Right  (spine) Left   Pain/Dysfunction with Movement Goal   Lumbar Flexion  100% --- Pulling felt, pain coming up ---   Lumbar Extension  75% --- Pain increase (sharp)  100% min to no pain   Lumbar Side Bending  100% 100% R side reproduction of midline pain ---   Lumbar Rotation 100% 100% --- ---     Hip PROM Screen: WNL and pain free     STRENGTH:    L/E MMT Right Left Goal   Hip Flexion  3+/5 4-/5 5/5 B   Hip Extension  4-/5 4-/5 4+/5 B   Hip Abduction  4/5 4-/5 4+/5 B   Knee Extension 4+/5 4+/5 5/5 B   Knee Flexion 4+/5 4+/5 5/5 B   Hip IR 4/5 4-/5 5/5 B   Hip ER 4/5 4/5 5/5 B     SPECIAL TESTS:     Right  (spine) Left    SLR  Negative Negative   SI Joint Cluster  Negative ---     Palpation: Increased tone and tenderness noted with palpation of lumbar spinous processes, paraspinals, and  scarum    Posture:  Pt presents with postural abnormalities which include: forward head, rounded shoulders , and increased lumbar lordosis    Gait Analysis: The patient ambulated with the following gait abnormalities: trendelenburg      Movement Analysis Observations noted   Plank on Elbows  5.26 seconds held with shaking and lordosis in lumbar spine increase     FOTO Lumbar Survey    Therapist reviewed FOTO scores for Cindy Alan on 5/17/2024.   FOTO documents entered into [employer] - see Media section.    Score: 54         Treatment     Total Treatment time (time-based codes) separate from Evaluation: 23 minutes     Cindy received the treatments listed below:      neuromuscular re-education activities to improve: Balance, Coordination, Kinesthetic, Sense, Proprioception, and Posture for 8 minutes. The following activities were included:    TA activation   Hip ABD/ADD isometrics     therapeutic activities to improve functional performance for 15  minutes, including:    HEP education   Use of lumbar support methods for work setting and for sleeping tolerance     Patient Education and Home Exercises     Education provided:   - justification and explanation of HEP and treatment   - plan of care   - anatomical and biomechanical mechanisms in involved regions     Written Home Exercises Provided: yes. Exercises were reviewed and Cindy was able to demonstrate them prior to the end of the session.  Cindy demonstrated good  understanding of the education provided. See EMR under Patient Instructions for exercises provided during therapy sessions.    Assessment     Cindy is a 44 y.o. female referred to outpatient Physical Therapy with a medical diagnosis of lumbar back pain. Patient presents with deficits in strength, mobility, core stability, posture and gait. This is affecting patient positional tolerance and functional lifting ability for activities of daily living.     Patient prognosis is Good.   Patient  will benefit from skilled outpatient Physical Therapy to address the deficits stated above and in the chart below, provide patient /family education, and to maximize patientt's level of independence.     Plan of care discussed with patient: Yes  Patient's spiritual, cultural and educational needs considered and patient is agreeable to the plan of care and goals as stated below:     Anticipated Barriers for therapy: none    Medical Necessity is demonstrated by the following  History  Co-morbidities and personal factors that may impact the plan of care [x] LOW: no personal factors / co-morbidities  [] MODERATE: 1-2 personal factors / co-morbidities  [] HIGH: 3+ personal factors / co-morbidities    Moderate / High Support Documentation: n/a     Examination  Body Structures and Functions, activity limitations and participation restrictions that may impact the plan of care [x] LOW: addressing 1-2 elements  [] MODERATE: 3+ elements  [] HIGH: 4+ elements (please support below)    Moderate / High Support Documentation: n/a     Clinical Presentation [x] LOW: stable  [] MODERATE: Evolving  [] HIGH: Unstable     Decision Making/ Complexity Score: low       Goals:    Short Term Goals:  3 weeks Progress   5/17/2024   Pain: Pt will demonstrate improved pain by reports of less than or equal to 6/10 worst pain on the verbal rating scale in order to progress toward maximal functional ability and improve QOL. PC   Function: Patient will demonstrate improved function as indicated by a functional score of greater than or equal to 62 out of 100 on FOTO. PC   HEP: Patient will demonstrate independence with HEP in order to progress toward functional independence. PC     Long Term Goals:  6 weeks Progress  5/17/2024   Pain: Pt will demonstrate improved pain by reports of less than or equal to 2/10 worst pain on the verbal rating scale in order to progress toward maximal functional ability and improve QOL.   PC   Function: Patient will  demonstrate improved function as indicated by a functional score of greater than or equal to 70 out of 100 on FOTO. PC   Mobility: Patient will improve AROM to stated goals, listed in objective measures above, in order to return to maximal functional potential and improve quality of life. PC   Strength: Patient will improve strength to stated goals, listed in objective measures above, in order to improve functional independence and quality of life. PC   Postural Control: Patient will demonstrate plank on elbow hold for 30 seconds without pain onset or observable loss of lumbar posture for improved postural stability for tolerance of prolonged sitting at work.  PC   GOALS KEY:   PC= progressing/continue; PM= partially met;        DC= discontinue  Plan     Plan of care Certification: 5/17/2024 to 6/28/2024.    Outpatient Physical Therapy 2 times weekly for 6 weeks to include the following interventions: Cervical/Lumbar Traction, Electrical Stimulation with or without FDN, Gait Training, Manual Therapy, Moist Heat/ Ice, Neuromuscular Re-ed, Orthotic Management and Training, Patient Education, Self Care, Therapeutic Activities, Therapeutic Exercise, and Dry Needling .     Andree Sunshine, PT

## 2024-05-20 PROBLEM — M62.81 WEAKNESS OF TRUNK MUSCULATURE: Status: ACTIVE | Noted: 2024-05-20

## 2024-05-21 ENCOUNTER — CLINICAL SUPPORT (OUTPATIENT)
Dept: REHABILITATION | Facility: HOSPITAL | Age: 44
End: 2024-05-21
Payer: COMMERCIAL

## 2024-05-21 DIAGNOSIS — M62.81 WEAKNESS OF TRUNK MUSCULATURE: Primary | ICD-10-CM

## 2024-05-21 PROCEDURE — 97110 THERAPEUTIC EXERCISES: CPT | Mod: PN,CQ

## 2024-05-21 PROCEDURE — 97112 NEUROMUSCULAR REEDUCATION: CPT | Mod: PN,CQ

## 2024-05-21 NOTE — PROGRESS NOTES
"[facility]   Physical Therapy Treatment Note      Name: Cindy Alan  Clinic Number: [id number]    Therapy Diagnosis:   Encounter Diagnosis   Name Primary?    Weakness of trunk musculature Yes     Physician: Jose Mcdonnell MD    Visit Date: 5/21/2024    Physician Orders: PT Eval and Treat   Medical Diagnosis from Referral:  Lumbar back pain [M54.50]   Evaluation Date: 5/17/2024  Authorization Period Expiration: 5/6/2025   Plan of Care Expiration: 6/28/2024  Progress Note Due: 6/17/2024  Visit # / Visits authorized: 1/ 1; 1/ 20  FOTO: 1/3     Precautions: Standard     PTA Visit #: 1/5     Time In: 3:00pm  Time Out: 4:04pm  Total Time: 38 minutes    Subjective     Patient reports: primary complaints of discomfort and pain in the morning with some reoccurrence later in the day.  She  was partially  compliant with home exercise program.  Response to previous treatment: n/a  Functional change: n/a    Pain: 3/10  Location: low back      Objective      Objective Measures updated at progress report unless specified.     Treatment     Cindy received the treatments listed below:      therapeutic exercises to develop strength, endurance, ROM, and flexibility for 15 minutes including:  Nustep 7 minutes  Seated ball roll outs 5x 3 way  Seated lateral flexion stretch; 5 x5" each  Lower Trunk Rotation 2 minutes  DKTC; 2 x10  SKTC; 5 x10" each    manual therapy techniques: Joint mobilizations, Manual traction, Myofacial release, and Soft tissue Mobilization were applied to the: low back for 00 minutes, including:  Not performed today     neuromuscular re-education activities to improve: Balance, Coordination, Kinesthetic, Sense, Proprioception, and Posture for 23 minutes. The following activities were included:     TA activation; 2x 10  PPT; 2 x10  Bridge isometrics; 15x5"  Modified single leg bridge; 2 x10  Hip ABD/ADD isometrics      therapeutic activities to improve functional " performance for 00  minutes, including:       Patient Education and Home Exercises       Education provided:   - consistency with home exercises  - consistency with abdominal bracing during activity  - minimizing exacerbations of pain when possible    Written Home Exercises Provided: Patient instructed to cont prior HEP. Exercises were reviewed and Cindy was able to demonstrate them prior to the end of the session.  Cindy demonstrated good  understanding of the education provided. See Electronic Medical Record under Patient Instructions for exercises provided during therapy sessions    Assessment     Today is patient's first treatment visit since initial evaluation. Introduced patient to lumbar and hip mobility as well as core stability and hip strengthening activities. Spent time during treatment session going over uses of wedges/pillows in all positions in attempt to provide patient with greater comfort/relief at night as well as options for sleep. Also discussed with patient about potential mobility activities she can perform first thing in the morning to help decrease subjective complaints. Discussed with patient importance of abdominal bracing throughout activities of daily living as well as exercises for gradual progression of strengthening and support for low back.    Cindy Is progressing well towards her goals.   Patient prognosis is Good.     Patient will continue to benefit from skilled outpatient physical therapy to address the deficits listed in the problem list box on initial evaluation, provide pt/family education and to maximize pt's level of independence in the home and community environment.     Patient's spiritual, cultural and educational needs considered and pt agreeable to plan of care and goals.     Anticipated barriers to physical therapy: none    Goals:   Short Term Goals:  3 weeks Progress   5/17/2024   Pain: Pt will demonstrate improved pain by reports of less than or equal to  6/10 worst pain on the verbal rating scale in order to progress toward maximal functional ability and improve QOL. PC   Function: Patient will demonstrate improved function as indicated by a functional score of greater than or equal to 62 out of 100 on FOTO. PC   HEP: Patient will demonstrate independence with HEP in order to progress toward functional independence. PC      Long Term Goals:  6 weeks Progress  5/17/2024   Pain: Pt will demonstrate improved pain by reports of less than or equal to 2/10 worst pain on the verbal rating scale in order to progress toward maximal functional ability and improve QOL.   PC   Function: Patient will demonstrate improved function as indicated by a functional score of greater than or equal to 70 out of 100 on FOTO. PC   Mobility: Patient will improve AROM to stated goals, listed in objective measures above, in order to return to maximal functional potential and improve quality of life. PC   Strength: Patient will improve strength to stated goals, listed in objective measures above, in order to improve functional independence and quality of life. PC   Postural Control: Patient will demonstrate plank on elbow hold for 30 seconds without pain onset or observable loss of lumbar posture for improved postural stability for tolerance of prolonged sitting at work.  PC   GOALS KEY:   PC= progressing/continue;   PM= partially met;             DC= discontinue  Plan      Plan of care Certification: 5/17/2024 to 6/28/2024.     Outpatient Physical Therapy 2 times weekly for 6 weeks to include the following interventions: Cervical/Lumbar Traction, Electrical Stimulation with or without FDN, Gait Training, Manual Therapy, Moist Heat/ Ice, Neuromuscular Re-ed, Orthotic Management and Training, Patient Education, Self Care, Therapeutic Activities, Therapeutic Exercise, and Dry Needling .     Cornelius Viveros, PTA

## 2024-05-24 ENCOUNTER — CLINICAL SUPPORT (OUTPATIENT)
Dept: REHABILITATION | Facility: HOSPITAL | Age: 44
End: 2024-05-24
Payer: COMMERCIAL

## 2024-05-24 DIAGNOSIS — M62.81 WEAKNESS OF TRUNK MUSCULATURE: Primary | ICD-10-CM

## 2024-05-24 PROCEDURE — 97110 THERAPEUTIC EXERCISES: CPT | Mod: PN

## 2024-05-24 PROCEDURE — 97112 NEUROMUSCULAR REEDUCATION: CPT | Mod: PN

## 2024-05-24 NOTE — PROGRESS NOTES
"[facility]   Physical Therapy Treatment Note      Name: Cindy Alan  Clinic Number: [id number]    Therapy Diagnosis:   Encounter Diagnosis   Name Primary?    Weakness of trunk musculature Yes       Physician: Jose Mcdonnell MD    Visit Date: 5/24/2024    Physician Orders: PT Eval and Treat   Medical Diagnosis from Referral:  Lumbar back pain [M54.50]   Evaluation Date: 5/17/2024  Authorization Period Expiration: 5/6/2025   Plan of Care Expiration: 6/28/2024  Progress Note Due: 6/17/2024  Visit # / Visits authorized: 1/ 1; 2/ 20  FOTO: 1/3     Precautions: Standard     PTA Visit #: 1/5     Time In: 3:00 pm  Time Out: 3:55 pm  Total Time: 55 minutes    Subjective     Patient reports: she is feeling pretty good today with only some minor pain in more the mid back   She  was partially  compliant with home exercise program.  Response to previous treatment: feeling a little better   Functional change: better sleep     Pain: 3/10  Location: mid to low back      Objective      Objective Measures updated at progress report unless specified.     Treatment     Cindy received the treatments listed below:      therapeutic exercises to develop strength, endurance, ROM, and flexibility for 25 minutes including:  Nustep 7 minutes  Standing on wall trunk flexion x 15   Figure 4 trunk rotation stretch x 10 5" holds B   Open Books x 10 B   DKTC; 2 x10  Shuttle Squats 3 minutes 5"     manual therapy techniques: Joint mobilizations, Manual traction, Myofacial release, and Soft tissue Mobilization were applied to the: low back for 00 minutes, including:  Not performed today     neuromuscular re-education activities to improve: Balance, Coordination, Kinesthetic, Sense, Proprioception, and Posture for 30 minutes. The following activities were included:     TA activation with ball press 6" holds 3 minutes   PPT; 2 x10  Bridge; 2 x 10  Hip ADD isometrics 2x 10 5" holds  SL clams 2 x 10 5" " holds  Cat/cow standing x 8   Leaning hip extension x 10 B     therapeutic activities to improve functional performance for 00  minutes, including:     Patient Education and Home Exercises       Education provided:   - consistency with home exercises  - consistency with abdominal bracing during activity  - minimizing exacerbations of pain when possible    Written Home Exercises Provided: Patient instructed to cont prior HEP. Exercises were reviewed and Cindy was able to demonstrate them prior to the end of the session.  Cindy demonstrated good  understanding of the education provided. See Electronic Medical Record under Patient Instructions for exercises provided during therapy sessions    Assessment   Patient reports positive response to first follow up session today. Therefore, progression of lumbar and hip mobility as well as core stability and hip strengthening activities. Patient tolerates this well. Only some minor discomfort was noted at start of bridges, but this improved with reps. Patient denies any increase in pain by end of session today. Will continue to progress per tolerance.     Cindy Is progressing well towards her goals.   Patient prognosis is Good.     Patient will continue to benefit from skilled outpatient physical therapy to address the deficits listed in the problem list box on initial evaluation, provide pt/family education and to maximize pt's level of independence in the home and community environment.     Patient's spiritual, cultural and educational needs considered and pt agreeable to plan of care and goals.     Anticipated barriers to physical therapy: none    Goals:   Short Term Goals:  3 weeks Progress   5/17/2024   Pain: Pt will demonstrate improved pain by reports of less than or equal to 6/10 worst pain on the verbal rating scale in order to progress toward maximal functional ability and improve QOL. PC   Function: Patient will demonstrate improved function as indicated  by a functional score of greater than or equal to 62 out of 100 on FOTO. PC   HEP: Patient will demonstrate independence with HEP in order to progress toward functional independence. PC      Long Term Goals:  6 weeks Progress  5/17/2024   Pain: Pt will demonstrate improved pain by reports of less than or equal to 2/10 worst pain on the verbal rating scale in order to progress toward maximal functional ability and improve QOL.   PC   Function: Patient will demonstrate improved function as indicated by a functional score of greater than or equal to 70 out of 100 on FOTO. PC   Mobility: Patient will improve AROM to stated goals, listed in objective measures above, in order to return to maximal functional potential and improve quality of life. PC   Strength: Patient will improve strength to stated goals, listed in objective measures above, in order to improve functional independence and quality of life. PC   Postural Control: Patient will demonstrate plank on elbow hold for 30 seconds without pain onset or observable loss of lumbar posture for improved postural stability for tolerance of prolonged sitting at work.  PC   GOALS KEY:   PC= progressing/continue;   PM= partially met;             DC= discontinue  Plan      Plan of care Certification: 5/17/2024 to 6/28/2024.     Outpatient Physical Therapy 2 times weekly for 6 weeks to include the following interventions: Cervical/Lumbar Traction, Electrical Stimulation with or without FDN, Gait Training, Manual Therapy, Moist Heat/ Ice, Neuromuscular Re-ed, Orthotic Management and Training, Patient Education, Self Care, Therapeutic Activities, Therapeutic Exercise, and Dry Needling .     Andree Sunshine, PT

## 2024-05-28 ENCOUNTER — CLINICAL SUPPORT (OUTPATIENT)
Dept: REHABILITATION | Facility: HOSPITAL | Age: 44
End: 2024-05-28
Payer: COMMERCIAL

## 2024-05-28 DIAGNOSIS — M62.81 WEAKNESS OF TRUNK MUSCULATURE: Primary | ICD-10-CM

## 2024-05-28 PROCEDURE — 97112 NEUROMUSCULAR REEDUCATION: CPT | Mod: PN

## 2024-05-28 PROCEDURE — 97110 THERAPEUTIC EXERCISES: CPT | Mod: PN

## 2024-05-28 NOTE — PROGRESS NOTES
"[facility]   Physical Therapy Treatment Note      Name: Cindy Alan  Clinic Number: [id number]    Therapy Diagnosis:   Encounter Diagnosis   Name Primary?    Weakness of trunk musculature Yes       Physician: Jose Mcdonnell MD    Visit Date: 5/28/2024    Physician Orders: PT Eval and Treat   Medical Diagnosis from Referral:  Lumbar back pain [M54.50]   Evaluation Date: 5/17/2024  Authorization Period Expiration: 5/6/2025   Plan of Care Expiration: 6/28/2024  Progress Note Due: 6/17/2024  Visit # / Visits authorized: 1/ 1; 3/ 20  FOTO: 1/3     Precautions: Standard     PTA Visit #: 1/5     Time In: 2:55 pm  Time Out: 3:55 pm  Total Time: 60 minutes    Subjective     Patient reports: she was a little stiff over the weekend and pretty sore after last session, but she is doing well today.   She  was  compliant with home exercise program.  Response to previous treatment: feeling a little better   Functional change: better sleep     Pain: 3/10  Location: mid to low back      Objective      Objective Measures updated at progress report unless specified.     Treatment     Cindy received the treatments listed below:      therapeutic exercises to develop strength, endurance, ROM, and flexibility for 30 minutes including:  Nustep 7 minutes  Standing on wall trunk flexion x 15   Figure 4 trunk rotation stretch x 10 5" holds B   Open Books x 10 B   DKTC; 2 x10  Shuttle Squats 3 minutes 5"     manual therapy techniques: Joint mobilizations, Manual traction, Myofacial release, and Soft tissue Mobilization were applied to the: low back for 00 minutes, including:  Not performed today     neuromuscular re-education activities to improve: Balance, Coordination, Kinesthetic, Sense, Proprioception, and Posture for 30 minutes. The following activities were included:     TA activation with ball press 6" holds 3 minutes   PPT; 2 x10  PPT + Bridge; 2 x 10  Hip ADD isometrics 2x 10 5" holds  SL " "clams 2 x 10 5" holds  Cat/cow standing x 8   Leaning hip extension x 10 B  Plank on elbows off 24" box 3 x 20"      therapeutic activities to improve functional performance for 00  minutes, including:     Patient Education and Home Exercises       Education provided:   - consistency with home exercises  - consistency with abdominal bracing during activity  - minimizing exacerbations of pain when possible    Written Home Exercises Provided: Patient instructed to cont prior HEP. Exercises were reviewed and Cindy was able to demonstrate them prior to the end of the session.  Cindy demonstrated good  understanding of the education provided. See Electronic Medical Record under Patient Instructions for exercises provided during therapy sessions    Assessment   Continued with treatment focusing on lumbar and hip mobility as well as core stability and hip strengthening activities. Patient tolerates this well with progressions today to further address core stability deficits. Discomfort with bridges remains present, but is corrected by PPT done in conjunction with bridge reps. No discomfort is noted with added planks from higher surface. Will continue to progress per tolerance.     Cindy Is progressing well towards her goals.   Patient prognosis is Good.     Patient will continue to benefit from skilled outpatient physical therapy to address the deficits listed in the problem list box on initial evaluation, provide pt/family education and to maximize pt's level of independence in the home and community environment.     Patient's spiritual, cultural and educational needs considered and pt agreeable to plan of care and goals.     Anticipated barriers to physical therapy: none    Goals:   Short Term Goals:  3 weeks Progress   5/17/2024   Pain: Pt will demonstrate improved pain by reports of less than or equal to 6/10 worst pain on the verbal rating scale in order to progress toward maximal functional ability and " improve QOL. PC   Function: Patient will demonstrate improved function as indicated by a functional score of greater than or equal to 62 out of 100 on FOTO. PC   HEP: Patient will demonstrate independence with HEP in order to progress toward functional independence. PC      Long Term Goals:  6 weeks Progress  5/17/2024   Pain: Pt will demonstrate improved pain by reports of less than or equal to 2/10 worst pain on the verbal rating scale in order to progress toward maximal functional ability and improve QOL.   PC   Function: Patient will demonstrate improved function as indicated by a functional score of greater than or equal to 70 out of 100 on FOTO. PC   Mobility: Patient will improve AROM to stated goals, listed in objective measures above, in order to return to maximal functional potential and improve quality of life. PC   Strength: Patient will improve strength to stated goals, listed in objective measures above, in order to improve functional independence and quality of life. PC   Postural Control: Patient will demonstrate plank on elbow hold for 30 seconds without pain onset or observable loss of lumbar posture for improved postural stability for tolerance of prolonged sitting at work.  PC   GOALS KEY:   PC= progressing/continue;   PM= partially met;             DC= discontinue  Plan      Plan of care Certification: 5/17/2024 to 6/28/2024.     Outpatient Physical Therapy 2 times weekly for 6 weeks to include the following interventions: Cervical/Lumbar Traction, Electrical Stimulation with or without FDN, Gait Training, Manual Therapy, Moist Heat/ Ice, Neuromuscular Re-ed, Orthotic Management and Training, Patient Education, Self Care, Therapeutic Activities, Therapeutic Exercise, and Dry Needling .     Andree Sunshine, PT

## 2024-05-30 ENCOUNTER — OFFICE VISIT (OUTPATIENT)
Dept: ORTHOPEDICS | Facility: CLINIC | Age: 44
End: 2024-05-30
Payer: COMMERCIAL

## 2024-05-30 ENCOUNTER — PATIENT OUTREACH (OUTPATIENT)
Dept: ADMINISTRATIVE | Facility: HOSPITAL | Age: 44
End: 2024-05-30
Payer: COMMERCIAL

## 2024-05-30 ENCOUNTER — TELEPHONE (OUTPATIENT)
Dept: ORTHOPEDICS | Facility: CLINIC | Age: 44
End: 2024-05-30

## 2024-05-30 VITALS — HEIGHT: 64 IN | WEIGHT: 206 LBS | BODY MASS INDEX: 35.17 KG/M2

## 2024-05-30 DIAGNOSIS — M54.50 LUMBAR PAIN: ICD-10-CM

## 2024-05-30 DIAGNOSIS — Z12.11 SPECIAL SCREENING FOR MALIGNANT NEOPLASMS, COLON: Primary | ICD-10-CM

## 2024-05-30 DIAGNOSIS — M23.92 INTERNAL DERANGEMENT OF LEFT KNEE: Primary | ICD-10-CM

## 2024-05-30 PROCEDURE — 3008F BODY MASS INDEX DOCD: CPT | Mod: CPTII,S$GLB,, | Performed by: ORTHOPAEDIC SURGERY

## 2024-05-30 PROCEDURE — 99203 OFFICE O/P NEW LOW 30 MIN: CPT | Mod: S$GLB,,, | Performed by: ORTHOPAEDIC SURGERY

## 2024-05-30 PROCEDURE — 99999 PR PBB SHADOW E&M-EST. PATIENT-LVL III: CPT | Mod: PBBFAC,,, | Performed by: ORTHOPAEDIC SURGERY

## 2024-05-30 PROCEDURE — 1159F MED LIST DOCD IN RCRD: CPT | Mod: CPTII,S$GLB,, | Performed by: ORTHOPAEDIC SURGERY

## 2024-05-30 RX ORDER — METHOCARBAMOL 750 MG/1
750 TABLET, FILM COATED ORAL 3 TIMES DAILY PRN
Qty: 30 TABLET | Refills: 0 | Status: SHIPPED | OUTPATIENT
Start: 2024-05-30 | End: 2024-06-09

## 2024-05-30 NOTE — PROGRESS NOTES
Subjective:     Patient ID: Cindy Alan is a 44 y.o. female.    Chief Complaint: Pain of the Right Knee and Pain of the Left Knee    HPI:  05/30/2024   Left knee pain, popping, catching.  Patient started with the pain sometime in October or November of last year 2023.  Does not describe any injuries or any traumas per se.  She stated that it started hurting as well as starting with some lumbar pain.  She takes ibuprofen 800 mg.  Recently primary case which the meloxicam and she has not started that yet.  She complained of some back pain and some burning down the lateral aspect of the calf on the left.  She was started on physical therapy at [facility] and she feels her knees pop and now the right knee is popping since she is started therapy.  The left knee occasionally gives out and she feels catching.  And she feels like bone-on-bone when she does stairs.  She points anteriorly around the inferior pole of the patella.  Can not kneel on it.  Can not squat.  It pops alot and sometimes it is painful most of the times it has not  She does have some back pain in the lower part.  Denies any loss of bowel bladder control blurry vision double vision loss sense smell or taste or fever or chills shortness of breath or numbness and tingling in the feet it all stops around the tibia anterolaterally.  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  Pain is 3/10    Past Medical History:   Diagnosis Date    Asthma     Essential (primary) hypertension      Past Surgical History:   Procedure Laterality Date    DNC       Family History   Problem Relation Name Age of Onset    Kidney disease Mother janusz     Diabetes Mother janusz     Hypertension Mother janusz     Thyroiditis Mother janusz     Cataracts Mother janusz     Birth defects Mother janusz     Heart disease Mother janusz     Vision loss Mother janusz     Hypertension Maternal Grandfather Pan     Asthma Maternal Grandmother miya     Heart disease Maternal Grandmother miya      Hypertension Maternal Grandmother miya     Miscarriages / Stillbirths Maternal Grandmother miya     Diabetes Maternal Aunt Elenora     Heart disease Maternal Aunt Elenora     Hypertension Maternal Aunt Elenora     Kidney disease Maternal Aunt Elenora     Vision loss Maternal Aunt Elenora     Heart disease Maternal Uncle Vance     Hypertension Maternal Uncle Vance      Social History     Socioeconomic History    Marital status: Other   Tobacco Use    Smoking status: Never     Passive exposure: Never    Smokeless tobacco: Never   Substance and Sexual Activity    Alcohol use: Not Currently    Drug use: Never    Sexual activity: Yes     Partners: Male     Birth control/protection: None     Social Determinants of Health     Financial Resource Strain: Low Risk  (5/16/2024)    Overall Financial Resource Strain (CARDIA)     Difficulty of Paying Living Expenses: Not hard at all   Food Insecurity: No Food Insecurity (5/16/2024)    Hunger Vital Sign     Worried About Running Out of Food in the Last Year: Never true     Ran Out of Food in the Last Year: Never true   Transportation Needs: No Transportation Needs (5/3/2024)    PRAPARE - Transportation     Lack of Transportation (Medical): No     Lack of Transportation (Non-Medical): No   Physical Activity: Sufficiently Active (5/16/2024)    Exercise Vital Sign     Days of Exercise per Week: 5 days     Minutes of Exercise per Session: 60 min   Stress: No Stress Concern Present (5/16/2024)    [employer] - Occupational Stress Questionnaire     Feeling of Stress : Not at all   Housing Stability: Unknown (5/16/2024)    Housing Stability Vital Sign     Unable to Pay for Housing in the Last Year: No     Medication List with Changes/Refills   New Medications    METHOCARBAMOL (ROBAXIN) 750 MG TAB    Take 1 tablet (750 mg total) by mouth 3 (three) times daily as needed (Spasms).   Current Medications    AZELASTINE (ASTELIN) 137 MCG (0.1 %) NASAL SPRAY    1  spray (137 mcg total) by Nasal route 2 (two) times daily.    FEXOFENADINE (ALLEGRA) 180 MG TABLET    Take 180 mg by mouth once daily.    FLUTICASONE PROPIONATE (FLONASE) 50 MCG/ACTUATION NASAL SPRAY    2 sprays (100 mcg total) by Each Nostril route once daily.    HYDROCHLOROTHIAZIDE (HYDRODIURIL) 12.5 MG TAB    Take 1 tablet (12.5 mg total) by mouth once daily.    MELOXICAM (MOBIC) 15 MG TABLET    Take 1 tablet (15 mg total) by mouth once daily. Take with meal.    MONTELUKAST (SINGULAIR) 10 MG TABLET    Take 10 mg by mouth.     Review of patient's allergies indicates:   Allergen Reactions    Neomycin Swelling    Penicillin Swelling    Cefdinir      Other Reaction(s): ABDOMEN PAIN    Reports abdominal pain    Adhesive tape-silicones     Neomycin-polymyxin b-dexameth      Eye cream    Amoxicillin Rash    Azithromycin Rash    Latex, natural rubber Rash     Review of Systems   Constitutional: Negative for decreased appetite.   HENT:  Negative for tinnitus.    Eyes:  Negative for double vision.   Cardiovascular:  Negative for chest pain.   Respiratory:  Negative for wheezing.    Hematologic/Lymphatic: Negative for bleeding problem.   Skin:  Negative for dry skin.   Musculoskeletal:  Positive for back pain, joint pain and muscle cramps. Negative for arthritis, gout, neck pain and stiffness.   Gastrointestinal:  Negative for abdominal pain.   Genitourinary:  Negative for bladder incontinence.   Neurological:  Negative for numbness, paresthesias and sensory change.   Psychiatric/Behavioral:  Negative for altered mental status.        Objective:   Body mass index is 35.36 kg/m².  There were no vitals filed for this visit.       General    Constitutional: She is oriented to person, place, and time. She appears well-developed.   HENT:   Head: Atraumatic.   Eyes: EOM are normal.   Pulmonary/Chest: Effort normal.   Neurological: She is alert and oriented to person, place, and time.   Psychiatric: Judgment normal.            Ambulating without any assistive devices  Pelvis is level   Negative straight leg raising  Bilateral hips passive motion without pain.  There is very mild discomfort if any over the greater trochanters  Hip flexors and abductors and adductors are 5/5 with quads and hamstrings and ankle extensors and flexors   Left knee today without pain at the joint line medially or laterally, there is some slight hypersensitivity to patella ballottement.  It did pop during flexion and extension.  Patella feels midline.  There is no defect in the patella or quadriceps tendon.  Collaterals and cruciates are stable.  Anterior drawer is negative.  She does have full range of motion and no swelling in feels that a lot of the pain is anterior behind patella tendon  Right knee full range.  Collaterals and cruciates stable.  No crepitus felt.  No defect in the patella or quadriceps tendon   Calves are soft nontender there is no popliteal pain   Very mild varicosities   Skin is warm to touch  Ankle motion intact    Relevant imaging results reviewed and interpreted by me, discussed with the patient and / or family today   X-ray 11/29/2023 joint space very well maintained left knee.  There is no joint space narrowing , no fracture there is no swelling, there is no marginal osteophytes basically within normal limits  MRI left knee 05/09/2024 personally reviewed with the patient basically negative.  Very small intrasubstance signal in both medial and lateral meniscus but there is no true tear.  LCL, MCL, PCL, ACL all negative.  There is no cartilage changes, no effusion and no loose bodies  X-ray 5/6/24 of the lumbar spine there is no evidence of fracture.  The disc space is very well maintained could not see any facet changes and no spondylo listhesis/basically within normal  Assessment:     Encounter Diagnoses   Name Primary?    Internal derangement of left knee Yes    Lumbar pain         Plan:   Internal derangement of left  knee  -     methocarbamoL (ROBAXIN) 750 MG Tab; Take 1 tablet (750 mg total) by mouth 3 (three) times daily as needed (Spasms).  Dispense: 30 tablet; Refill: 0    Lumbar pain  -     methocarbamoL (ROBAXIN) 750 MG Tab; Take 1 tablet (750 mg total) by mouth 3 (three) times daily as needed (Spasms).  Dispense: 30 tablet; Refill: 0         Patient Instructions   You been having lumbar pain and you just started physical therapy  When you doing the deep knee bends you still hurting and you have popping in both knees  You having quite a bit of pain doing stairs and kneeling is impossible.  Going on stairs feels like bone touching bone and very tight  You do feel occasional catching   X-rays of your knees show excellent alignment there is no joint narrowing no bone spurs basically normal   The MRI of your left knee also is basically normal  Recommendations   Stop ibuprofen 800 and start meloxicam 15 mg once a day as prescribed by primary care  I will give you methocarbamol as a muscle relaxants since you having muscle cramps at night and that should help also with the back  You can take with the meloxicam if you hurting a lot Tylenol 650 mg up to 3 times a day  You are going to physical therapy which is the 1st step at this point to have strengthening of her legs   Even though MRI has been negative there are 15% false negatives that the MRI could miss   The next step would be to try injection of steroid inside the knee joint if not better    Explanation of the popping in the knees when it has not painful as suctioned phenomena which a lot of joints do have as long as it has not very painful it is usually benign.  Also did tell the patient MRIs have 15% false negatives.  We will treat non operatively until everything fails.  In the future if she continues to complain severe pain and failed non operative treatment that she may need arthroscopic surgery for evaluation/diagnostic.  Also we will try injections in the future  prior to surgery    Disclaimer: This note was prepared using a voice recognition system and is likely to have sound alike errors within the text.

## 2024-05-30 NOTE — PROGRESS NOTES
Population Health Chart Review & Patient Outreach Details      Additional [facility] Health Notes:               Updates Requested / Reviewed:      Updated Care Coordination Note, Care Everywhere, and Immunizations Reconciliation Completed or Queried: [facility] Topics Overdue:      VBHM Score: 3     Cervical Cancer Screening  Colon Cancer Screening  Hemoglobin A1c                       Health Maintenance Topic(s) Outreach Outcomes & Actions Taken:    Colorectal Cancer Screening - Outreach Outcomes & Actions Taken  : Colonoscopy Case Request / Referral / Home Test Order Placed and [facility] referral to endo  placed per KEYSHAWN in basket message 05/30/2024 stating that pt agrees to colonoscopy.

## 2024-05-30 NOTE — PATIENT INSTRUCTIONS
You been having lumbar pain and you just started physical therapy  When you doing the deep knee bends you still hurting and you have popping in both knees  You having quite a bit of pain doing stairs and kneeling is impossible.  Going on stairs feels like bone touching bone and very tight  You do feel occasional catching   X-rays of your knees show excellent alignment there is no joint narrowing no bone spurs basically normal   The MRI of your left knee also is basically normal  Recommendations   Stop ibuprofen 800 and start meloxicam 15 mg once a day as prescribed by primary care  I will give you methocarbamol as a muscle relaxants since you having muscle cramps at night and that should help also with the back  You can take with the meloxicam if you hurting a lot Tylenol 650 mg up to 3 times a day  You are going to physical therapy which is the 1st step at this point to have strengthening of her legs   Even though MRI has been negative there are 15% false negatives that the MRI could miss   The next step would be to try injection of steroid inside the knee joint if not better

## 2024-06-03 ENCOUNTER — CLINICAL SUPPORT (OUTPATIENT)
Dept: REHABILITATION | Facility: HOSPITAL | Age: 44
End: 2024-06-03
Payer: COMMERCIAL

## 2024-06-03 DIAGNOSIS — M54.50 LUMBAR PAIN: ICD-10-CM

## 2024-06-03 DIAGNOSIS — M62.81 WEAKNESS OF TRUNK MUSCULATURE: Primary | ICD-10-CM

## 2024-06-03 DIAGNOSIS — M23.92 INTERNAL DERANGEMENT OF LEFT KNEE: ICD-10-CM

## 2024-06-03 PROCEDURE — 97112 NEUROMUSCULAR REEDUCATION: CPT | Mod: PN

## 2024-06-03 PROCEDURE — 97110 THERAPEUTIC EXERCISES: CPT | Mod: PN

## 2024-06-03 NOTE — PROGRESS NOTES
"[facility]   Physical Therapy Treatment Note      Name: Cindy Alan  Clinic Number: [id number]    Therapy Diagnosis:   Encounter Diagnoses   Name Primary?    Internal derangement of left knee     Lumbar pain     Weakness of trunk musculature Yes       Physician: Jose Mcdonnell MD    Visit Date: 6/3/2024    Physician Orders: PT Eval and Treat   Medical Diagnosis from Referral:  Lumbar back pain [M54.50]   Evaluation Date: 5/17/2024  Authorization Period Expiration: 5/6/2025   Plan of Care Expiration: 6/28/2024  Progress Note Due: 6/17/2024  Visit # / Visits authorized: 1/ 1; 4/ 20  FOTO: 1/3     Precautions: Standard     PTA Visit #: 1/5     Time In: 3:30 pm  Time Out: 4:30 pm  Total Time: 60 minutes (30 minutes 1:1 with technician)     Subjective     Patient reports: she has been feeling much better and has no pain today   She  was  compliant with home exercise program.  Response to previous treatment: feeling a little better   Functional change: better sleep     Pain: 0/10  Location: mid to low back      Objective      Objective Measures updated at progress report unless specified.     Treatment     Cindy received the treatments listed below:      therapeutic exercises to develop strength, endurance, ROM, and flexibility for 30 minutes including:  Nustep 7 minutes  Standing on wall trunk flexion x 15   Figure 4 trunk rotation stretch x 10 5" holds B   Open Books x 10 B   DKTC; 2 x10  Shuttle Squats 3 minutes 5"     manual therapy techniques: Joint mobilizations, Manual traction, Myofacial release, and Soft tissue Mobilization were applied to the: low back for 00 minutes, including:  Not performed today     neuromuscular re-education activities to improve: Balance, Coordination, Kinesthetic, Sense, Proprioception, and Posture for 30 minutes. The following activities were included:     TA activation with ball press 6" holds 3 minutes   PPT; 2 x10  PPT + Bridge; 2 x 10  Hip " "ADD isometrics 2x 10 5" holds  SL clams 2 x 10 5" holds  Cat/cow standing x 8   Leaning hip extension x 10 B  Plank on elbows off 23" box 3 x 20"   Standing Hip 3-way x 6 B      therapeutic activities to improve functional performance for 00  minutes, including:     Patient Education and Home Exercises       Education provided:   - consistency with home exercises  - consistency with abdominal bracing during activity  - minimizing exacerbations of pain when possible    Written Home Exercises Provided: Patient instructed to cont prior HEP. Exercises were reviewed and Cindy was able to demonstrate them prior to the end of the session.  Cindy demonstrated good  understanding of the education provided. See Electronic Medical Record under Patient Instructions for exercises provided during therapy sessions    Assessment   Continued with treatment focusing on lumbar and hip mobility as well as core stability and hip strengthening activities with progressions, as patient presents denying pain today. Patient tolerates this well with fatigue complaints but no pain onset. Plank surface was able to be lowered without compensation onset for improved core stability demonstration. Will continue to progress per tolerance.     Cindy Is progressing well towards her goals.   Patient prognosis is Good.     Patient will continue to benefit from skilled outpatient physical therapy to address the deficits listed in the problem list box on initial evaluation, provide pt/family education and to maximize pt's level of independence in the home and community environment.     Patient's spiritual, cultural and educational needs considered and pt agreeable to plan of care and goals.     Anticipated barriers to physical therapy: none    Goals:   Short Term Goals:  3 weeks Progress   5/17/2024   Pain: Pt will demonstrate improved pain by reports of less than or equal to 6/10 worst pain on the verbal rating scale in order to progress " toward maximal functional ability and improve QOL. PC   Function: Patient will demonstrate improved function as indicated by a functional score of greater than or equal to 62 out of 100 on FOTO. PC   HEP: Patient will demonstrate independence with HEP in order to progress toward functional independence. PC      Long Term Goals:  6 weeks Progress  5/17/2024   Pain: Pt will demonstrate improved pain by reports of less than or equal to 2/10 worst pain on the verbal rating scale in order to progress toward maximal functional ability and improve QOL.   PC   Function: Patient will demonstrate improved function as indicated by a functional score of greater than or equal to 70 out of 100 on FOTO. PC   Mobility: Patient will improve AROM to stated goals, listed in objective measures above, in order to return to maximal functional potential and improve quality of life. PC   Strength: Patient will improve strength to stated goals, listed in objective measures above, in order to improve functional independence and quality of life. PC   Postural Control: Patient will demonstrate plank on elbow hold for 30 seconds without pain onset or observable loss of lumbar posture for improved postural stability for tolerance of prolonged sitting at work.  PC   GOALS KEY:   PC= progressing/continue;   PM= partially met;             DC= discontinue  Plan      Plan of care Certification: 5/17/2024 to 6/28/2024.     Outpatient Physical Therapy 2 times weekly for 6 weeks to include the following interventions: Cervical/Lumbar Traction, Electrical Stimulation with or without FDN, Gait Training, Manual Therapy, Moist Heat/ Ice, Neuromuscular Re-ed, Orthotic Management and Training, Patient Education, Self Care, Therapeutic Activities, Therapeutic Exercise, and Dry Needling .     Andree Sunshine, PT

## 2024-06-06 ENCOUNTER — CLINICAL SUPPORT (OUTPATIENT)
Dept: REHABILITATION | Facility: HOSPITAL | Age: 44
End: 2024-06-06
Payer: COMMERCIAL

## 2024-06-06 DIAGNOSIS — M62.81 WEAKNESS OF TRUNK MUSCULATURE: Primary | ICD-10-CM

## 2024-06-06 PROCEDURE — 97112 NEUROMUSCULAR REEDUCATION: CPT | Mod: PN

## 2024-06-06 PROCEDURE — 97110 THERAPEUTIC EXERCISES: CPT | Mod: PN

## 2024-06-06 NOTE — PROGRESS NOTES
"[facility]   Physical Therapy Treatment Note      Name: Cindy Alan  Clinic Number: [id number]    Therapy Diagnosis:   Encounter Diagnosis   Name Primary?    Weakness of trunk musculature Yes       Physician: Jose Mcdonnell MD    Visit Date: 6/6/2024    Physician Orders: PT Eval and Treat   Medical Diagnosis from Referral:  Lumbar back pain [M54.50]   Evaluation Date: 5/17/2024  Authorization Period Expiration: 5/6/2025   Plan of Care Expiration: 6/28/2024  Progress Note Due: 6/17/2024  Visit # / Visits authorized: 1/ 1; 5/ 20  FOTO: 1/3 (FOTO NV)     Precautions: Standard     PTA Visit #: 1/5     Time In: 3:00 pm  Time Out: 4:00 pm  Total Time: 60 minutes     Subjective     Patient reports: pain has remained minimal to none this week.  She  was  compliant with home exercise program.  Response to previous treatment: feeling a little better   Functional change: better sleep     Pain: 0/10  Location: mid to low back      Objective      Objective Measures updated at progress report unless specified.     Treatment     Cindy received the treatments listed below:      therapeutic exercises to develop strength, endurance, ROM, and flexibility for 20 minutes including:  Nustep 7 minutes  Standing on wall trunk flexion x 15   Figure 4 trunk rotation stretch x 10 5" holds B   DKTC; 2 x10  Shuttle Squats 3 minutes 5"     manual therapy techniques: Joint mobilizations, Manual traction, Myofacial release, and Soft tissue Mobilization were applied to the: low back for 00 minutes, including:  Not performed today     neuromuscular re-education activities to improve: Balance, Coordination, Kinesthetic, Sense, Proprioception, and Posture for 40 minutes. The following activities were included:     TA activation with CL ball press 6" holds 3 minutes   Table top holds 4 x 20"   PPT; 3 x10  PPT + Bridge; 2 x 10  Hip ADD isometrics 2x 10 5" holds  SL clams 3 x 10 5" holds  Cat/cow standing x 8 " "  Leaning hip extension x 10 B  Plank on elbows off 18" box 3 x 30"   Standing Hip 3-way x 8 B      therapeutic activities to improve functional performance for 00  minutes, including:     Patient Education and Home Exercises       Education provided:   - consistency with home exercises  - consistency with abdominal bracing during activity  - minimizing exacerbations of pain when possible    Written Home Exercises Provided: Patient instructed to cont prior HEP. Exercises were reviewed and Cindy was able to demonstrate them prior to the end of the session.  Cindy demonstrated good  understanding of the education provided. See Electronic Medical Record under Patient Instructions for exercises provided during therapy sessions    Assessment   Continued with treatment focusing on lumbar and hip mobility as well as core stability and hip strengthening activities with progressions. Patient tolerates this well with fatigue complaints but no pain onset. Cues remain needed for compensation corrections, but patient does well with self corrections following initial cues.  Will continue to progress per tolerance.     Cindy Is progressing well towards her goals.   Patient prognosis is Good.     Patient will continue to benefit from skilled outpatient physical therapy to address the deficits listed in the problem list box on initial evaluation, provide pt/family education and to maximize pt's level of independence in the home and community environment.     Patient's spiritual, cultural and educational needs considered and pt agreeable to plan of care and goals.     Anticipated barriers to physical therapy: none    Goals:   Short Term Goals:  3 weeks Progress   5/17/2024   Pain: Pt will demonstrate improved pain by reports of less than or equal to 6/10 worst pain on the verbal rating scale in order to progress toward maximal functional ability and improve QOL. PC   Function: Patient will demonstrate improved function as " indicated by a functional score of greater than or equal to 62 out of 100 on FOTO. PC   HEP: Patient will demonstrate independence with HEP in order to progress toward functional independence. PC      Long Term Goals:  6 weeks Progress  5/17/2024   Pain: Pt will demonstrate improved pain by reports of less than or equal to 2/10 worst pain on the verbal rating scale in order to progress toward maximal functional ability and improve QOL.   PC   Function: Patient will demonstrate improved function as indicated by a functional score of greater than or equal to 70 out of 100 on FOTO. PC   Mobility: Patient will improve AROM to stated goals, listed in objective measures above, in order to return to maximal functional potential and improve quality of life. PC   Strength: Patient will improve strength to stated goals, listed in objective measures above, in order to improve functional independence and quality of life. PC   Postural Control: Patient will demonstrate plank on elbow hold for 30 seconds without pain onset or observable loss of lumbar posture for improved postural stability for tolerance of prolonged sitting at work.  PC   GOALS KEY:   PC= progressing/continue;   PM= partially met;             DC= discontinue  Plan      Plan of care Certification: 5/17/2024 to 6/28/2024.     Outpatient Physical Therapy 2 times weekly for 6 weeks to include the following interventions: Cervical/Lumbar Traction, Electrical Stimulation with or without FDN, Gait Training, Manual Therapy, Moist Heat/ Ice, Neuromuscular Re-ed, Orthotic Management and Training, Patient Education, Self Care, Therapeutic Activities, Therapeutic Exercise, and Dry Needling .     Andree Sunshine, PT

## 2024-06-10 ENCOUNTER — CLINICAL SUPPORT (OUTPATIENT)
Dept: REHABILITATION | Facility: HOSPITAL | Age: 44
End: 2024-06-10
Payer: COMMERCIAL

## 2024-06-10 DIAGNOSIS — M62.81 WEAKNESS OF TRUNK MUSCULATURE: Primary | ICD-10-CM

## 2024-06-10 PROCEDURE — 97112 NEUROMUSCULAR REEDUCATION: CPT | Mod: PN

## 2024-06-10 PROCEDURE — 97110 THERAPEUTIC EXERCISES: CPT | Mod: PN

## 2024-06-10 NOTE — PROGRESS NOTES
"[facility]   Physical Therapy Treatment Note      Name: Cindy Alan  Clinic Number: [id number]    Therapy Diagnosis:   Encounter Diagnosis   Name Primary?    Weakness of trunk musculature Yes         Physician: Jose Mcdonnell MD    Visit Date: 6/10/2024    Physician Orders: PT Eval and Treat   Medical Diagnosis from Referral:  Lumbar back pain [M54.50]   Evaluation Date: 5/17/2024  Authorization Period Expiration: 5/6/2025   Plan of Care Expiration: 6/28/2024  Progress Note Due: 6/17/2024  Visit # / Visits authorized: 1/ 1; 6/ 20  FOTO: 2/3      Precautions: Standard     PTA Visit #: 1/5     Time In: 3:30 pm  Time Out: 4:30 pm  Total Time: 60 minutes (45 minutes 1:1 with trained technician)     Subjective     Patient reports: no flare ups since last seen.  She  was  compliant with home exercise program.  Response to previous treatment: feeling a little better   Functional change: better sleep     Pain: 0/10  Location: mid to low back      Objective      Objective Measures updated at progress report unless specified.   FOTO Lumbar Survey    Therapist reviewed FOTO scores for Cindy Alan on 6/10/2024.   FOTO documents entered into EP[employer] - see Media section.    Score: 65          Treatment     Cindy received the treatments listed below:      therapeutic exercises to develop strength, endurance, ROM, and flexibility for 20 minutes including:  Nustep 7 minutes  Standing on wall trunk flexion x 15   Figure 4 trunk rotation stretch x 10 5" holds B   Shuttle Squats 3 minutes 5"     manual therapy techniques: Joint mobilizations, Manual traction, Myofacial release, and Soft tissue Mobilization were applied to the: low back for 00 minutes, including:  Not performed today     neuromuscular re-education activities to improve: Balance, Coordination, Kinesthetic, Sense, Proprioception, and Posture for 40 minutes. The following activities were included:     TA activation with CL " "ball press 6" holds 3 minutes   Table top holds 4 x 20"   PPT; 3 x10  PPT + Bridge; 2 x 10  SL clams 3 x 10 5" holds  Cat/cow standing x 8   Leaning hip extension x 10 B  Plank on elbows off 18" box 3 x 30"   Standing Hip 3-way x 10 B   Paloff Press 2 x 10 40#     therapeutic activities to improve functional performance for 00  minutes, including:     Patient Education and Home Exercises       Education provided:   - consistency with home exercises  - consistency with abdominal bracing during activity  - minimizing exacerbations of pain when possible    Written Home Exercises Provided: Patient instructed to cont prior HEP. Exercises were reviewed and Cindy was able to demonstrate them prior to the end of the session.  Cindy demonstrated good  understanding of the education provided. See Electronic Medical Record under Patient Instructions for exercises provided during therapy sessions    Assessment   Continued with treatment focusing on lumbar and hip mobility as well as core stability and hip strengthening activities with progressions. Patient does well with carryover from cues from prior sessions. FOTO score update is noted to show functional improvements with therapy thus far. Plan to address functional progress further with therapeutic activity for application of strength and postural control gains.     Cindy Is progressing well towards her goals.   Patient prognosis is Good.     Patient will continue to benefit from skilled outpatient physical therapy to address the deficits listed in the problem list box on initial evaluation, provide pt/family education and to maximize pt's level of independence in the home and community environment.     Patient's spiritual, cultural and educational needs considered and pt agreeable to plan of care and goals.     Anticipated barriers to physical therapy: none    Goals:   Short Term Goals:  3 weeks Progress   5/17/2024   Pain: Pt will demonstrate improved pain by " reports of less than or equal to 6/10 worst pain on the verbal rating scale in order to progress toward maximal functional ability and improve QOL. PC   Function: Patient will demonstrate improved function as indicated by a functional score of greater than or equal to 62 out of 100 on FOTO. PC   HEP: Patient will demonstrate independence with HEP in order to progress toward functional independence. PC      Long Term Goals:  6 weeks Progress  5/17/2024   Pain: Pt will demonstrate improved pain by reports of less than or equal to 2/10 worst pain on the verbal rating scale in order to progress toward maximal functional ability and improve QOL.   PC   Function: Patient will demonstrate improved function as indicated by a functional score of greater than or equal to 70 out of 100 on FOTO. PC   Mobility: Patient will improve AROM to stated goals, listed in objective measures above, in order to return to maximal functional potential and improve quality of life. PC   Strength: Patient will improve strength to stated goals, listed in objective measures above, in order to improve functional independence and quality of life. PC   Postural Control: Patient will demonstrate plank on elbow hold for 30 seconds without pain onset or observable loss of lumbar posture for improved postural stability for tolerance of prolonged sitting at work.  PC   GOALS KEY:   PC= progressing/continue;   PM= partially met;             DC= discontinue  Plan      Plan of care Certification: 5/17/2024 to 6/28/2024.     Outpatient Physical Therapy 2 times weekly for 6 weeks to include the following interventions: Cervical/Lumbar Traction, Electrical Stimulation with or without FDN, Gait Training, Manual Therapy, Moist Heat/ Ice, Neuromuscular Re-ed, Orthotic Management and Training, Patient Education, Self Care, Therapeutic Activities, Therapeutic Exercise, and Dry Needling .     Andree Sunshine, PT

## 2024-06-13 ENCOUNTER — TELEPHONE (OUTPATIENT)
Dept: REHABILITATION | Facility: HOSPITAL | Age: 44
End: 2024-06-13

## 2024-07-13 DIAGNOSIS — J01.90 ACUTE BACTERIAL SINUSITIS: ICD-10-CM

## 2024-07-13 DIAGNOSIS — B96.89 ACUTE BACTERIAL SINUSITIS: ICD-10-CM

## 2024-07-14 NOTE — TELEPHONE ENCOUNTER
Refill Routing Note   Medication(s) are not appropriate for processing by [facility] for the following reason(s):        New or recently adjusted medication  No active prescription written by provider    [facility] action(s):  Defer      Medication Therapy Plan: LOV 5/6/24      Appointments  past 12m or future 3m with PCP    Date Provider   Last Visit   5/6/2024 Jose Mcdonnell MD   Next Visit   Visit date not found Jose Mcdonnell MD   ED visits in past 90 days: 0        Note composed:10:54 PM 07/13/2024

## 2024-07-15 RX ORDER — FLUTICASONE PROPIONATE 50 MCG
2 SPRAY, SUSPENSION (ML) NASAL DAILY
Qty: 48 ML | Refills: 3 | Status: SHIPPED | OUTPATIENT
Start: 2024-07-15

## 2024-07-22 ENCOUNTER — OFFICE VISIT (OUTPATIENT)
Dept: PODIATRY | Facility: CLINIC | Age: 44
End: 2024-07-22
Payer: COMMERCIAL

## 2024-07-22 VITALS — HEIGHT: 64 IN | WEIGHT: 206.13 LBS | BODY MASS INDEX: 35.19 KG/M2

## 2024-07-22 DIAGNOSIS — M72.2 PLANTAR FASCIITIS: Primary | ICD-10-CM

## 2024-07-22 DIAGNOSIS — M24.572 CONTRACTURE, LEFT ANKLE: ICD-10-CM

## 2024-07-22 DIAGNOSIS — M79.672 PAIN IN LEFT FOOT: ICD-10-CM

## 2024-07-22 PROCEDURE — 3008F BODY MASS INDEX DOCD: CPT | Mod: CPTII,S$GLB,, | Performed by: PODIATRIST

## 2024-07-22 PROCEDURE — 1159F MED LIST DOCD IN RCRD: CPT | Mod: CPTII,S$GLB,, | Performed by: PODIATRIST

## 2024-07-22 PROCEDURE — 99204 OFFICE O/P NEW MOD 45 MIN: CPT | Mod: S$GLB,,, | Performed by: PODIATRIST

## 2024-07-22 PROCEDURE — 99999 PR PBB SHADOW E&M-EST. PATIENT-LVL III: CPT | Mod: PBBFAC,,, | Performed by: PODIATRIST

## 2024-07-22 PROCEDURE — 1160F RVW MEDS BY RX/DR IN RCRD: CPT | Mod: CPTII,S$GLB,, | Performed by: PODIATRIST

## 2024-07-22 RX ORDER — DICLOFENAC SODIUM 75 MG/1
75 TABLET, DELAYED RELEASE ORAL 2 TIMES DAILY
Qty: 60 TABLET | Refills: 0 | Status: SHIPPED | OUTPATIENT
Start: 2024-07-22 | End: 2024-08-21

## 2024-07-22 NOTE — PROGRESS NOTES
Subjective:       Patient ID: Cindy Alan is a 44 y.o. female.    Chief Complaint: Foot Pain (Right foot pain, rates pain 8 , nondiabetic )      HPI: Cindy Alan complains of moderate pains to the right foot. States pains are sharp and stabbing-like in nature. Pains are to the plantar foot, mostly with walking and standing. Rates the pains at approx. 8/10. States post-static dyskinesia. Denies any recent identifiable trauma. Does limp with gait. No NSAID medications thus far. Pains have been present for the past several weeks to months and the pains have worsened over the past couple of weeks. She does have a history of plantar fasciitis. States walking and standing causes and/or exacerbates the symptoms. Patient has had no corticosteroid injection(s) to the right foot, prior. Patient's Primary Care Provider is Jose Mcdonnell MD.     Review of patient's allergies indicates:   Allergen Reactions    Neomycin Swelling    Penicillin Swelling    Cefdinir      Other Reaction(s): ABDOMEN PAIN    Reports abdominal pain    Adhesive tape-silicones     Neomycin-polymyxin b-dexameth      Eye cream    Amoxicillin Rash    Azithromycin Rash    Latex, natural rubber Rash       Past Medical History:   Diagnosis Date    Asthma     Essential (primary) hypertension        Family History   Problem Relation Name Age of Onset    Kidney disease Mother janusz     Diabetes Mother janusz     Hypertension Mother janusz     Thyroiditis Mother janusz     Cataracts Mother janusz     Birth defects Mother janusz     Heart disease Mother janusz     Vision loss Mother janusz     Hypertension Maternal Grandfather Pan     Asthma Maternal Grandmother miya     Heart disease Maternal Grandmother miya     Hypertension Maternal Grandmother miya     Miscarriages / Stillbirths Maternal Grandmother miya     Diabetes Maternal Aunt Elenora     Heart disease Maternal Aunt Elenora     Hypertension Maternal Aunt Elenora     Kidney disease Maternal  Aunt Elenora     Vision loss Maternal Aunt Elenora     Heart disease Maternal Uncle Vance     Hypertension Maternal Uncle Vance        Social History     Socioeconomic History    Marital status: Other   Tobacco Use    Smoking status: Never     Passive exposure: Never    Smokeless tobacco: Never   Substance and Sexual Activity    Alcohol use: Not Currently    Drug use: Never    Sexual activity: Yes     Partners: Male     Birth control/protection: None     Social Determinants of Health     Financial Resource Strain: Low Risk  (5/16/2024)    Overall Financial Resource Strain (CARDIA)     Difficulty of Paying Living Expenses: Not hard at all   Food Insecurity: No Food Insecurity (5/16/2024)    Hunger Vital Sign     Worried About Running Out of Food in the Last Year: Never true     Ran Out of Food in the Last Year: Never true   Transportation Needs: No Transportation Needs (5/3/2024)    PRAPARE - Transportation     Lack of Transportation (Medical): No     Lack of Transportation (Non-Medical): No   Physical Activity: Sufficiently Active (5/16/2024)    Exercise Vital Sign     Days of Exercise per Week: 5 days     Minutes of Exercise per Session: 60 min   Stress: No Stress Concern Present (5/16/2024)    [employer] - Occupational Stress Questionnaire     Feeling of Stress : Not at all   Housing Stability: Unknown (5/16/2024)    Housing Stability Vital Sign     Unable to Pay for Housing in the Last Year: No       Past Surgical History:   Procedure Laterality Date    DNC         Review of Systems   Constitutional:  Negative for chills, fatigue and fever.   HENT:  Negative for hearing loss.    Eyes:  Negative for photophobia and visual disturbance.   Respiratory:  Negative for cough, chest tightness, shortness of breath and wheezing.    Cardiovascular:  Negative for chest pain and palpitations.   Gastrointestinal:  Negative for constipation, diarrhea, nausea and vomiting.   Endocrine: Negative  "for cold intolerance and heat intolerance.   Genitourinary:  Negative for flank pain.   Musculoskeletal:  Positive for gait problem. Negative for neck pain and neck stiffness.   Neurological:  Negative for light-headedness and headaches.   Psychiatric/Behavioral:  Negative for sleep disturbance.          Objective:   Ht 5' 4" (1.626 m)   Wt 93.5 kg (206 lb 2.1 oz)   BMI 35.38 kg/m²       Physical Exam   LOWER EXTREMITY PHYSICAL EXAMINATION  NEUROLOGY: Proprioception is intact. Sensation to light touch is intact. Negative Tinel's Sign and negative Valleix Sign. No neurological sensations with compression of the area of Peterson's Nerve in the area of the Abductor Hallucis muscle belly.    ORTHOPEDIC: There is minimal tenderness to palpation of the area around the plantar medial calcaneal tubercle on the right foot. Pains are characterized as sharp and stabbing-like with direct palpation of the area. There is also moderate pain to palpation of the immediate plantar aspect of the heel, and mild pains to the lateral band of the fascia. No edema is noted. No fullness is noted. There are mild pains to palpation within the plantar fascia at the arch. No defects are noted within the plantar fascia at the arch. No plantar fibromas are noted. No defects are noted within the ligament. Dorsiflexion of the MTPJs with simultaneous palpation of the fascia at the arch, does not worsen and exacerbate the pains. Plantarflexion of the MTPJs with simultaneous palpation of the fascia at the arch, does worsen and exacerbate the pains.  No pains with medial to lateral compression of the heel. Equinus contracture is noted. Antalgic gait pattern is noted.     DERMATOLOGY: Skin is supple, dry and intact.    Assessment:     1. Plantar fasciitis    2. Pain in left foot    3. Contracture, left ankle          Plan:     Plantar fasciitis  -     diclofenac (VOLTAREN) 75 MG EC tablet; Take 1 tablet (75 mg total) by mouth 2 (two) times daily.  " Dispense: 60 tablet; Refill: 0    Pain in left foot  -     diclofenac (VOLTAREN) 75 MG EC tablet; Take 1 tablet (75 mg total) by mouth 2 (two) times daily.  Dispense: 60 tablet; Refill: 0    Contracture, left ankle              Future Appointments   Date Time Provider Department Center   10/2/2024  3:00 PM Vilma Phillip DO [facility]

## 2024-07-24 ENCOUNTER — OFFICE VISIT (OUTPATIENT)
Dept: PODIATRY | Facility: CLINIC | Age: 44
End: 2024-07-24
Payer: COMMERCIAL

## 2024-07-24 ENCOUNTER — PATIENT MESSAGE (OUTPATIENT)
Dept: PODIATRY | Facility: CLINIC | Age: 44
End: 2024-07-24

## 2024-07-24 DIAGNOSIS — M24.572 CONTRACTURE, LEFT ANKLE: ICD-10-CM

## 2024-07-24 DIAGNOSIS — M72.2 PLANTAR FASCIITIS: Primary | ICD-10-CM

## 2024-07-24 DIAGNOSIS — M79.672 PAIN IN LEFT FOOT: ICD-10-CM

## 2024-07-24 PROCEDURE — 99999 PR PBB SHADOW E&M-EST. PATIENT-LVL III: CPT | Mod: PBBFAC,,, | Performed by: PODIATRIST

## 2024-07-24 PROCEDURE — 99499 UNLISTED E&M SERVICE: CPT | Mod: S$GLB,,, | Performed by: PODIATRIST

## 2024-07-24 PROCEDURE — 20550 NJX 1 TENDON SHEATH/LIGAMENT: CPT | Mod: RT,S$GLB,, | Performed by: PODIATRIST

## 2024-07-24 RX ORDER — TRIAMCINOLONE ACETONIDE 40 MG/ML
40 INJECTION, SUSPENSION INTRA-ARTICULAR; INTRAMUSCULAR ONCE
Status: COMPLETED | OUTPATIENT
Start: 2024-07-24 | End: 2024-07-24

## 2024-07-24 RX ORDER — DEXAMETHASONE SODIUM PHOSPHATE 4 MG/ML
4 INJECTION, SOLUTION INTRA-ARTICULAR; INTRALESIONAL; INTRAMUSCULAR; INTRAVENOUS; SOFT TISSUE ONCE
Status: COMPLETED | OUTPATIENT
Start: 2024-07-24 | End: 2024-07-24

## 2024-07-24 RX ADMIN — TRIAMCINOLONE ACETONIDE 40 MG: 40 INJECTION, SUSPENSION INTRA-ARTICULAR; INTRAMUSCULAR at 01:07

## 2024-07-24 RX ADMIN — DEXAMETHASONE SODIUM PHOSPHATE 4 MG: 4 INJECTION, SOLUTION INTRA-ARTICULAR; INTRALESIONAL; INTRAMUSCULAR; INTRAVENOUS; SOFT TISSUE at 01:07

## 2024-07-24 NOTE — PROGRESS NOTES
Plantar fasciitis  -     dexAMETHasone injection 4 mg  -     triamcinolone acetonide injection 40 mg    Pain in left foot    Contracture, left ankle       Following sterile preparation with alcohol swab and/or betadine paint, injection was given into and around the area of the right plantar medial calcaneal tubercle, plantar fibroma(s) as mentioned/noted above, dorsal midfoot at the joint(s) as mentioned above, and above noted scar tissues, using a 25-gauge needle. Injection consisted of a mixture of Lidocaine w/ Epinephrine, Marcaine w/o Epinephrine, Kenalog-40, and Dexamethasone.

## 2024-08-13 ENCOUNTER — PATIENT MESSAGE (OUTPATIENT)
Dept: PODIATRY | Facility: CLINIC | Age: 44
End: 2024-08-13
Payer: COMMERCIAL

## 2024-08-28 ENCOUNTER — PATIENT MESSAGE (OUTPATIENT)
Dept: PODIATRY | Facility: CLINIC | Age: 44
End: 2024-08-28
Payer: COMMERCIAL

## 2024-09-27 ENCOUNTER — OFFICE VISIT (OUTPATIENT)
Dept: OPHTHALMOLOGY | Facility: CLINIC | Age: 44
End: 2024-09-27
Payer: COMMERCIAL

## 2024-09-27 DIAGNOSIS — H16.143 PUNCTATE KERATITIS OF BOTH EYES: ICD-10-CM

## 2024-09-27 DIAGNOSIS — H43.391 VITREOUS FLOATERS OF RIGHT EYE: ICD-10-CM

## 2024-09-27 DIAGNOSIS — H26.9 CORTICAL CATARACT OF BOTH EYES: Primary | ICD-10-CM

## 2024-09-27 PROCEDURE — 99999 PR PBB SHADOW E&M-EST. PATIENT-LVL II: CPT | Mod: PBBFAC,,, | Performed by: OPHTHALMOLOGY

## 2024-09-27 NOTE — PROGRESS NOTES
HPI     Flashes and Floaters     Additional comments: Patient states she has experienced  flashes and   floater for the past 3 months, no increase in floaters OD. Patient states   flashes last only seconds and has gotten more intense. Notices flashe is   covering entire vision OD,. Patient describes film settling over OD on her   drive to work along with flashes. Patient concern due to vision loss in   peripheral OD           Last edited by Henry Brown on 9/27/2024  8:50 AM.            Assessment /Plan     For exam results, see Encounter Report.    Cortical cataract of both eyes  Cataracts are present but not visually significant. Will continue to monitor. Mrx deferred until patient evaluated for diabetes with PCP next week.    Punctate keratitis of both eyes  Patient with symptoms and exam consistent with dry eye. Start artificial tears 3-4 times daily with warm compresses      Vitreous floaters of right eye  No retinal holes, tears or detachments were seen after careful retinal evaluation.     Discussed retinal detachment signs and symptoms including flashes of lights, floaters, perceived curtains over vision. Advised patient to return to clinic urgently if these symptoms occur. Explained the need for follow up exams to the patient even if there are no changes in the symptoms.

## 2024-10-02 ENCOUNTER — OFFICE VISIT (OUTPATIENT)
Dept: INTERNAL MEDICINE | Facility: CLINIC | Age: 44
End: 2024-10-02
Payer: COMMERCIAL

## 2024-10-02 VITALS
HEIGHT: 64 IN | DIASTOLIC BLOOD PRESSURE: 80 MMHG | SYSTOLIC BLOOD PRESSURE: 118 MMHG | OXYGEN SATURATION: 99 % | TEMPERATURE: 97 F | WEIGHT: 202.81 LBS | RESPIRATION RATE: 20 BRPM | HEART RATE: 74 BPM | BODY MASS INDEX: 34.62 KG/M2

## 2024-10-02 DIAGNOSIS — I10 ESSENTIAL HYPERTENSION: ICD-10-CM

## 2024-10-02 DIAGNOSIS — Z12.11 COLON CANCER SCREENING: ICD-10-CM

## 2024-10-02 DIAGNOSIS — E66.811 OBESITY (BMI 30.0-34.9): ICD-10-CM

## 2024-10-02 DIAGNOSIS — R06.89 GASPING FOR BREATH: ICD-10-CM

## 2024-10-02 DIAGNOSIS — J30.9 CHRONIC ALLERGIC RHINITIS: ICD-10-CM

## 2024-10-02 DIAGNOSIS — R06.83 SNORING: ICD-10-CM

## 2024-10-02 DIAGNOSIS — Z00.00 ANNUAL PHYSICAL EXAM: Primary | ICD-10-CM

## 2024-10-02 DIAGNOSIS — N92.6 MISSED MENSES: ICD-10-CM

## 2024-10-02 LAB
B-HCG UR QL: NEGATIVE
BACTERIA #/AREA URNS AUTO: ABNORMAL /HPF
BILIRUB UR QL STRIP: NEGATIVE
CLARITY UR REFRACT.AUTO: CLEAR
COLOR UR AUTO: YELLOW
GLUCOSE UR QL STRIP: NEGATIVE
HGB UR QL STRIP: NEGATIVE
HYALINE CASTS UR QL AUTO: 2 /LPF
KETONES UR QL STRIP: NEGATIVE
LEUKOCYTE ESTERASE UR QL STRIP: ABNORMAL
MICROSCOPIC COMMENT: ABNORMAL
NITRITE UR QL STRIP: NEGATIVE
PH UR STRIP: 6 [PH] (ref 5–8)
PROT UR QL STRIP: NEGATIVE
RBC #/AREA URNS AUTO: 2 /HPF (ref 0–4)
SP GR UR STRIP: 1.02 (ref 1–1.03)
SQUAMOUS #/AREA URNS AUTO: 3 /HPF
URN SPEC COLLECT METH UR: ABNORMAL
WBC #/AREA URNS AUTO: 1 /HPF (ref 0–5)

## 2024-10-02 PROCEDURE — 1160F RVW MEDS BY RX/DR IN RCRD: CPT | Mod: CPTII,S$GLB,, | Performed by: INTERNAL MEDICINE

## 2024-10-02 PROCEDURE — 3079F DIAST BP 80-89 MM HG: CPT | Mod: CPTII,S$GLB,, | Performed by: INTERNAL MEDICINE

## 2024-10-02 PROCEDURE — 99396 PREV VISIT EST AGE 40-64: CPT | Mod: S$GLB,,, | Performed by: INTERNAL MEDICINE

## 2024-10-02 PROCEDURE — 3074F SYST BP LT 130 MM HG: CPT | Mod: CPTII,S$GLB,, | Performed by: INTERNAL MEDICINE

## 2024-10-02 PROCEDURE — 99999 PR PBB SHADOW E&M-EST. PATIENT-LVL IV: CPT | Mod: PBBFAC,,, | Performed by: INTERNAL MEDICINE

## 2024-10-02 PROCEDURE — 81025 URINE PREGNANCY TEST: CPT | Performed by: INTERNAL MEDICINE

## 2024-10-02 PROCEDURE — 81001 URINALYSIS AUTO W/SCOPE: CPT | Performed by: INTERNAL MEDICINE

## 2024-10-02 PROCEDURE — 3008F BODY MASS INDEX DOCD: CPT | Mod: CPTII,S$GLB,, | Performed by: INTERNAL MEDICINE

## 2024-10-02 PROCEDURE — 1159F MED LIST DOCD IN RCRD: CPT | Mod: CPTII,S$GLB,, | Performed by: INTERNAL MEDICINE

## 2024-10-02 NOTE — PROGRESS NOTES
"Subjective     Patient ID: Cindy Alan is a 44 y.o. Black or  female.    Chief Complaint: Establish Care    HTN--no longer taking hydrochlorothiazide; PRN. Blood pressure normalized; having hypotension on medication.  Obesity--improved diet; balanced diet with plenty of fruits and vegetable and decreased saturated fats. However, minimal physical activity. Plans to start going to gym with .  Recurrent sinus infections--Flonase, Astelin, Allegra, and Singulair. Has ~1x/month.  Asthma--no longer a problem; avoids allergens. Not taking inhaler/nebulizer(s).  Headaches--Ibuprofen and Tylenol PRN. Associated with sinus infections.   Left knee pain--meloxicam PRN. Followed by orthopedics. No longer having pain.  Plantar fascitis--followed by podiatry. Received steroid injections. No longer having pain.  Snoring--possible VALENTE. Snores while sleeping. Occasionally wakes up "choking" and "unable to breath." Previously scheduled for sleep study but did not complete.  Leg cramps--occasional. Wakes her from sleep. Has been trying to increase hydration and electrolyte intake, however, problem has continued.  Right breast pain--occasional sharp pain in upper outer aspect of right breast. Mammogram last year was normal other than fibrocystic changes in right breast. Requesting referral for breast specialist and repeat mammogram.        Review of Systems    Current Outpatient Medications on File Prior to Visit   Medication Sig Dispense Refill    azelastine (ASTELIN) 137 mcg (0.1 %) nasal spray 1 spray (137 mcg total) by Nasal route 2 (two) times daily. 30 mL 0    fexofenadine (ALLEGRA) 180 MG tablet Take 180 mg by mouth once daily.      fluticasone propionate (FLONASE) 50 mcg/actuation nasal spray 2 sprays (100 mcg total) by Each Nostril route once daily. 48 mL 3    hydroCHLOROthiazide (HYDRODIURIL) 12.5 MG Tab Take 1 tablet (12.5 mg total) by mouth once daily. 30 tablet 11    montelukast (SINGULAIR) 10 " mg tablet Take 10 mg by mouth.      meloxicam (MOBIC) 15 MG tablet Take 1 tablet (15 mg total) by mouth once daily. Take with meal. 30 tablet 1     No current facility-administered medications on file prior to visit.        Objective     Physical Exam       Assessment and Plan     1. Annual physical exam  -     HbA1c  -     CMP  -     CBC  -     TSH  -     Lipid panel  -     Ferritin  -     UA    2. Essential hypertension    3. Chronic allergic rhinitis    4. Missed menses  -     Urine pregnancy test    5. Snoring  -     Home Sleep Study    8. Colon cancer screening  -      Deferred colonoscopy  -      Fecal Immunochemical Test (iFOBT)      Moshe Fitzpatrick, MS4

## 2024-10-03 ENCOUNTER — LAB VISIT (OUTPATIENT)
Dept: LAB | Facility: HOSPITAL | Age: 44
End: 2024-10-03
Attending: INTERNAL MEDICINE
Payer: COMMERCIAL

## 2024-10-03 ENCOUNTER — TELEPHONE (OUTPATIENT)
Dept: SLEEP MEDICINE | Facility: CLINIC | Age: 44
End: 2024-10-03
Payer: COMMERCIAL

## 2024-10-03 DIAGNOSIS — Z00.00 ANNUAL PHYSICAL EXAM: ICD-10-CM

## 2024-10-03 LAB
ALBUMIN SERPL BCP-MCNC: 3.7 G/DL (ref 3.5–5.2)
ALP SERPL-CCNC: 71 U/L (ref 55–135)
ALT SERPL W/O P-5'-P-CCNC: 20 U/L (ref 10–44)
ANION GAP SERPL CALC-SCNC: 9 MMOL/L (ref 8–16)
AST SERPL-CCNC: 17 U/L (ref 10–40)
BASOPHILS # BLD AUTO: 0.03 K/UL (ref 0–0.2)
BASOPHILS NFR BLD: 0.5 % (ref 0–1.9)
BILIRUB SERPL-MCNC: 0.3 MG/DL (ref 0.1–1)
BUN SERPL-MCNC: 11 MG/DL (ref 6–20)
CALCIUM SERPL-MCNC: 9 MG/DL (ref 8.7–10.5)
CHLORIDE SERPL-SCNC: 107 MMOL/L (ref 95–110)
CHOLEST SERPL-MCNC: 211 MG/DL (ref 120–199)
CHOLEST/HDLC SERPL: 4.4 {RATIO} (ref 2–5)
CO2 SERPL-SCNC: 25 MMOL/L (ref 23–29)
CREAT SERPL-MCNC: 0.9 MG/DL (ref 0.5–1.4)
DIFFERENTIAL METHOD BLD: ABNORMAL
EOSINOPHIL # BLD AUTO: 0.2 K/UL (ref 0–0.5)
EOSINOPHIL NFR BLD: 3.4 % (ref 0–8)
ERYTHROCYTE [DISTWIDTH] IN BLOOD BY AUTOMATED COUNT: 12.4 % (ref 11.5–14.5)
EST. GFR  (NO RACE VARIABLE): >60 ML/MIN/1.73 M^2
ESTIMATED AVG GLUCOSE: 105 MG/DL (ref 68–131)
GLUCOSE SERPL-MCNC: 92 MG/DL (ref 70–110)
HBA1C MFR BLD: 5.3 % (ref 4–5.6)
HCT VFR BLD AUTO: 41.3 % (ref 37–48.5)
HDLC SERPL-MCNC: 48 MG/DL (ref 40–75)
HDLC SERPL: 22.7 % (ref 20–50)
HGB BLD-MCNC: 12.8 G/DL (ref 12–16)
IMM GRANULOCYTES # BLD AUTO: 0.01 K/UL (ref 0–0.04)
IMM GRANULOCYTES NFR BLD AUTO: 0.2 % (ref 0–0.5)
LDLC SERPL CALC-MCNC: 148.4 MG/DL (ref 63–159)
LYMPHOCYTES # BLD AUTO: 3 K/UL (ref 1–4.8)
LYMPHOCYTES NFR BLD: 45.9 % (ref 18–48)
MCH RBC QN AUTO: 28.6 PG (ref 27–31)
MCHC RBC AUTO-ENTMCNC: 31 G/DL (ref 32–36)
MCV RBC AUTO: 92 FL (ref 82–98)
MONOCYTES # BLD AUTO: 0.5 K/UL (ref 0.3–1)
MONOCYTES NFR BLD: 7.3 % (ref 4–15)
NEUTROPHILS # BLD AUTO: 2.8 K/UL (ref 1.8–7.7)
NEUTROPHILS NFR BLD: 42.7 % (ref 38–73)
NONHDLC SERPL-MCNC: 163 MG/DL
NRBC BLD-RTO: 0 /100 WBC
PLATELET # BLD AUTO: 229 K/UL (ref 150–450)
PMV BLD AUTO: 12.2 FL (ref 9.2–12.9)
POTASSIUM SERPL-SCNC: 4.4 MMOL/L (ref 3.5–5.1)
PROT SERPL-MCNC: 6.9 G/DL (ref 6–8.4)
RBC # BLD AUTO: 4.48 M/UL (ref 4–5.4)
SODIUM SERPL-SCNC: 141 MMOL/L (ref 136–145)
TRIGL SERPL-MCNC: 73 MG/DL (ref 30–150)
WBC # BLD AUTO: 6.47 K/UL (ref 3.9–12.7)

## 2024-10-03 PROCEDURE — 84443 ASSAY THYROID STIM HORMONE: CPT | Performed by: INTERNAL MEDICINE

## 2024-10-03 PROCEDURE — 85025 COMPLETE CBC W/AUTO DIFF WBC: CPT | Performed by: INTERNAL MEDICINE

## 2024-10-03 PROCEDURE — 82728 ASSAY OF FERRITIN: CPT | Performed by: INTERNAL MEDICINE

## 2024-10-03 PROCEDURE — 80053 COMPREHEN METABOLIC PANEL: CPT | Performed by: INTERNAL MEDICINE

## 2024-10-03 PROCEDURE — 80061 LIPID PANEL: CPT | Performed by: INTERNAL MEDICINE

## 2024-10-03 PROCEDURE — 36415 COLL VENOUS BLD VENIPUNCTURE: CPT | Performed by: INTERNAL MEDICINE

## 2024-10-03 PROCEDURE — 83036 HEMOGLOBIN GLYCOSYLATED A1C: CPT | Performed by: INTERNAL MEDICINE

## 2024-10-03 NOTE — PROGRESS NOTES
Cindy Alan  44 y.o.    Other  Black or  female  [id number]    Chief Complaint:  Chief Complaint   Patient presents with    [facility]    Annual Exam       History of Present Illness:  History of Present Illness    CHIEF COMPLAINT:  Ms. Alan presents today for follow-up.    HYPERTENSION:  She has a history of hypertension with blood pressure fluctuations, particularly during sinus infections, with readings as high as 170/100. She was previously prescribed hydrochlorothiazide but never started it due to concerns about low blood pressure. She previously took amlodipine 5 mg every 3-4 days to avoid hypotension.    ALLERGIES AND ASTHMA:  She takes daily allergy medications including Allegra, Flonase, and Singulair. She has a history of asthma but denies recent attacks or inhaler use.    GYNECOLOGICAL HEALTH:  She recently had a Pap smear at [facility]. She expresses concern about a possible pregnancy due to a missed menstrual cycle and requests a pregnancy test.    SNORING/GASPING:  She reports loud snoring and waking up gasping for air. She is concerned about sleep apnea.     PREVENTIVE CARE:  She is considering a flu vaccine but has not received one in a long time. She has not received any COVID-19 vaccines. HIV screening has been completed. She declined colonoscopy due to time constraints but is willing to undergo the annual Fit Kit test for colon cancer screening.    BREAST HEALTH:  A small nodule was found during a previous ultrasound. She is due for another mammogram and was recommended to see a breast specialist, but has not scheduled this due to insurance changes.    MUSCULOSKELETAL:  She reports leg cramps for about three months, occurring at night and varying in location from calf to upper leg. The affected area can be tender after cramping. She also mentions occasional lower back pain at night, particularly when getting out of bed, which has improved with physical  therapy.    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 She reports up and down eating patterns, mentioning stress eating as a factor. She had a two-year gap in regular check-ups due to her mother's illness and subsequent passing, with her last visit being for a specific problem rather than a routine exam.         Review of Systems   Constitutional:  Positive for malaise/fatigue. Negative for fever and weight loss.   HENT:  Negative for hearing loss.    Respiratory:  Negative for cough and shortness of breath.    Cardiovascular:  Positive for palpitations. Negative for chest pain and leg swelling.   Gastrointestinal:  Negative for abdominal pain, blood in stool, constipation and diarrhea.   Genitourinary:  Negative for dysuria.   Musculoskeletal:  Positive for myalgias. Negative for back pain and joint pain.   Skin:  Negative for rash.   Neurological:  Positive for headaches. Negative for dizziness and weakness.   Psychiatric/Behavioral:  Negative for depression. The patient is not nervous/anxious and does not have insomnia.        Laboratory  Lab Results   Component Value Date    WBC 8.55 12/15/2023    HGB 12.7 12/15/2023    HCT 38.5 12/15/2023     12/15/2023    CHOL 214 (H) 08/18/2023    TRIG 121 08/18/2023    HDL 43 08/18/2023    ALT 12 12/15/2023    AST 12 12/15/2023     12/15/2023    K 4.1 12/15/2023     12/15/2023    CREATININE 1.0 12/15/2023    BUN 9 12/15/2023    CO2 23 12/15/2023     Lab Results   Component Value Date    LDLCALC 149 (H) 08/18/2023       The following were reviewed: Active problem list, medication list, allergies, family history, social history, and Health Maintenance.     History:  Past Medical History:   Diagnosis Date    Asthma     Essential (primary) hypertension      Past Surgical History:   Procedure Laterality Date    DILATION AND CURETTAGE OF UTERUS       Family History   Problem Relation Name Age of Onset     Kidney disease Mother janusz     Diabetes Mother janusz     Hypertension Mother janusz     Thyroiditis Mother janusz     Cataracts Mother janusz     Birth defects Mother janusz     Heart disease Mother janusz     Vision loss Mother janusz     Hypertension Maternal Grandfather Pan     Asthma Maternal Grandmother miya     Heart disease Maternal Grandmother miya     Hypertension Maternal Grandmother miya     Miscarriages / Stillbirths Maternal Grandmother miya     Diabetes Maternal Aunt Elenora     Heart disease Maternal Aunt Elenora     Hypertension Maternal Aunt Elenora     Kidney disease Maternal Aunt Elenora     Vision loss Maternal Aunt Elenora     Heart disease Maternal Uncle Vance     Hypertension Maternal Uncle Vance      Social History     Socioeconomic History    Marital status: Other   Tobacco Use    Smoking status: Never     Passive exposure: Never    Smokeless tobacco: Never   Substance and Sexual Activity    Alcohol use: Not Currently    Drug use: Never    Sexual activity: Yes     Partners: Male     Birth control/protection: None     Social Drivers of Health     Financial Resource Strain: Low Risk  (5/16/2024)    Overall Financial Resource Strain (CARDIA)     Difficulty of Paying Living Expenses: Not hard at all   Food Insecurity: No Food Insecurity (5/16/2024)    Hunger Vital Sign     Worried About Running Out of Food in the Last Year: Never true     Ran Out of Food in the Last Year: Never true   Transportation Needs: No Transportation Needs (5/3/2024)    PRAPARE - Transportation     Lack of Transportation (Medical): No     Lack of Transportation (Non-Medical): No   Physical Activity: Sufficiently Active (5/16/2024)    Exercise Vital Sign     Days of Exercise per Week: 5 days     Minutes of Exercise per Session: 60 min   Stress: No Stress Concern Present (5/16/2024)    [employer] - Occupational Stress Questionnaire     Feeling of Stress : Not at all   Housing Stability:  Unknown (5/16/2024)    Housing Stability Vital Sign     Unable to Pay for Housing in the Last Year: No     Patient Active Problem List   Diagnosis    Essential hypertension    Severe obesity (BMI 35.0-39.9) with comorbidity    Weakness of trunk musculature     Review of patient's allergies indicates:   Allergen Reactions    Neomycin Swelling    Penicillin Swelling    Cefdinir      Other Reaction(s): ABDOMEN PAIN    Reports abdominal pain    Adhesive tape-silicones     Neomycin-polymyxin b-dexameth      Eye cream    Amoxicillin Rash    Azithromycin Rash    Latex, natural rubber Rash       Health Maintenance  Health Maintenance Topics with due status: Not Due       Topic Last Completion Date    TETANUS VACCINE 07/27/2015    Lipid Panel 04/11/2024    RSV Vaccine (Age 60+ and Pregnant patients) Not Due     Health Maintenance Due   Topic Date Due    Cervical Cancer Screening  Never done    HIV Screening  Never done    Hemoglobin A1c (Diabetic Prevention Screening)  Never done    Colorectal Cancer Screening  Never done    Mammogram  08/21/2024    Influenza Vaccine (1) 09/01/2024       Medications:  Current Outpatient Medications on File Prior to Visit   Medication Sig Dispense Refill    azelastine (ASTELIN) 137 mcg (0.1 %) nasal spray 1 spray (137 mcg total) by Nasal route 2 (two) times daily. 30 mL 0    fexofenadine (ALLEGRA) 180 MG tablet Take 180 mg by mouth once daily.      fluticasone propionate (FLONASE) 50 mcg/actuation nasal spray 2 sprays (100 mcg total) by Each Nostril route once daily. 48 mL 3    hydroCHLOROthiazide (HYDRODIURIL) 12.5 MG Tab Take 1 tablet (12.5 mg total) by mouth once daily. 30 tablet 11    montelukast (SINGULAIR) 10 mg tablet Take 10 mg by mouth.       No current facility-administered medications on file prior to visit.       Medications have been reviewed and reconciled with patient at visit today.    Exam:  Vitals:    10/02/24 1508   BP: 118/80   Pulse: 74   Resp: 20   Temp: 96.5 °F (35.8  °C)     Weight: 92 kg (202 lb 13.2 oz)   Body mass index is 34.81 kg/m².      Physical Exam    Vitals: Reviewed.  Constitutional: No acute distress. Well-developed. Not ill-appearing.  Eyes: No scleral icterus.  Cardiovascular: Normal rate and regular rhythm. Normal heart sounds.  Pulmonary: Pulmonary effort is normal. No respiratory distress. Normal breath sounds. Lungs sound clear.  Abdomen: Soft. Nontender. Nondistended. Normoactive bowel sounds.  Extremities: No edema.  Skin: Warm. Dry.  Neurological: Alert and oriented to person, place, and time.  Psychiatric: Behavior normal.         Assessment:  The primary encounter diagnosis was Annual physical exam. Diagnoses of Essential hypertension, Chronic allergic rhinitis, Missed menses, Snoring, Gasping for breath, Obesity (BMI 30.0-34.9), and Colon cancer screening were also pertinent to this visit.    Assessment & Plan    ANNUAL PHYSICAL EXAM:  - Counseled regarding age appropriate screenings and immunizations  - Counseled regarding lifestyle modifications    HYPERTENSION:  - Assessed patient's blood pressure, noting it was low and patient had not started prescribed hydrochlorothiazide.    ALLERGIES:  - Reviewed current allergy management with Allegra, Flonase, and Singulair.    ASTHMA:  - Evaluated asthma status, noting it is well-controlled without recent need for inhaler use.    PERIMENOPAUSE:  - Provided information on the possibility of pregnancy despite perimenopausal symptoms.    SNORING/GASPING:  - Sleep study    LABS:  - Ordered comprehensive lab work including: complete blood count, electrolyte panel, thyroid function tests, iron level, A1C, and cholesterol panel.  - Ordered urine pregnancy test to be performed in office.  - Provided Fit Kit for at-home stool sample collection.  - Ms. Wattels to return to lab tomorrow for labs.  - Ms. Wattels to bring completed Fit Kit stool sample to lab when returning for labs.    WOMEN'S HEALTH:  - Obtain and review  recent Pap smear results from previous provider.  - Obtain and review recent mammogram/ultrasound results.    HEALTH MAINTENANCE:  - Discussed importance of regular health screenings and annual check-ups.  - Explained alternative colon cancer screening options (Cologuard and Fit Kit) as less invasive options to colonoscopy.    FOLLOW UP:  - Follow up after lab results are available.

## 2024-10-04 ENCOUNTER — PATIENT OUTREACH (OUTPATIENT)
Dept: ADMINISTRATIVE | Facility: HOSPITAL | Age: 44
End: 2024-10-04
Payer: COMMERCIAL

## 2024-10-04 LAB
FERRITIN SERPL-MCNC: 124 NG/ML (ref 20–300)
TSH SERPL DL<=0.005 MIU/L-ACNC: 1.09 UIU/ML (ref 0.4–4)

## 2024-10-07 ENCOUNTER — PATIENT MESSAGE (OUTPATIENT)
Dept: INTERNAL MEDICINE | Facility: CLINIC | Age: 44
End: 2024-10-07
Payer: COMMERCIAL

## 2024-10-08 ENCOUNTER — HOSPITAL ENCOUNTER (OUTPATIENT)
Dept: SLEEP MEDICINE | Facility: HOSPITAL | Age: 44
Discharge: HOME | End: 2024-10-08
Attending: INTERNAL MEDICINE
Payer: COMMERCIAL

## 2024-10-08 DIAGNOSIS — E66.811 OBESITY (BMI 30.0-34.9): ICD-10-CM

## 2024-10-08 DIAGNOSIS — I10 ESSENTIAL HYPERTENSION: ICD-10-CM

## 2024-10-08 DIAGNOSIS — R06.89 GASPING FOR BREATH: ICD-10-CM

## 2024-10-08 DIAGNOSIS — R06.83 SNORING: ICD-10-CM

## 2024-10-08 PROCEDURE — 95806 SLEEP STUDY UNATT&RESP EFFT: CPT | Performed by: INTERNAL MEDICINE

## 2024-10-09 NOTE — PROCEDURES
"PHYSICIAN INTERPRETATION AND COMMENTS: Clinically significant sleep disordered breathing is not identified; the  frequency of the autonomic arousals might explain the daytime somnolence. Clinically significant sleep disordered  breathing is not identified. Please refer to sleep disorders clinic. Inlab polysomnography should be considered  CLINICAL HISTORY: 44 year old female presented with: 15 inch neck, BMI of 34.7, an Maple sleepiness score of 14, history  of hypertension, depression and symptoms of nocturnal snoring, waking up choking and witnessed apneas. Based on the  clinical history, the patient has a high pre-test probability of having Severe VALENTE.  SLEEP STUDY FINDINGS: Patient underwent a 1 night Home Sleep Test and by behavioral criteria, slept for approximately  6.53 hours, with a sleep latency of 6 minutes and a sleep efficiency of 96%. Snoring occurs for 22% (30 dB) of the study,  9.7% is very loud. The mean pulse rate is 69.9 BPM, with frequent pulse rate variability (53 events with >= 6 BPM  increase/decrease per hour).  TREATMENT CONSIDERATIONS: The high pre-test probability is inconsistent with the AHI severity. Consider further clinical  evaluation.  DISEASE MANAGEMENT CONSIDERATIONS: The patient's complaint of daytime somnolence is not explained by the study      Dear Vilma Phillip DO  [address] [place] [address] TE[place]AN ROWAN [id number]/Vilma Phillip DO         The sleep study that you ordered is complete.  You have ordered sleep LAB services to perform the sleep study for Cindy Alan .      Please find Sleep Study result in  the "Media tab" of Chart Review menu.        You can look  for the report in the  Media by the document type "Sleep Study Documents". Alphabetizing  "Document type" column helps to find the SLEEP STUDY report  Faster.       As the ordering provider, you are responsible for reviewing the results and implementing a treatment plan with your patient.    If you need a " "Sleep Medicine provider to explain the sleep study findings and arrange treatment for the patient, please refer patient for consultation to our Sleep Clinic via [facility] with Ambulatory Consult Sleep.     To do that please place an order for an  "Ambulatory Consult Sleep" -  order , it will go to our clinic work queue for our staff  to contact the patient for an appointment.      For any questions, please contact our sleep lab  staff at [phone] to talk to clinical staff          Yosef Wright MD   "

## 2024-10-10 ENCOUNTER — PATIENT MESSAGE (OUTPATIENT)
Dept: PULMONOLOGY | Facility: CLINIC | Age: 44
End: 2024-10-10
Payer: COMMERCIAL

## 2024-10-15 ENCOUNTER — PATIENT OUTREACH (OUTPATIENT)
Dept: ADMINISTRATIVE | Facility: HOSPITAL | Age: 44
End: 2024-10-15
Payer: COMMERCIAL

## 2024-10-15 NOTE — PROGRESS NOTES
[facility]LO MAMMOGRAM - PAP SMEAR 10/2/2024 uploaded to media.  Pap smear pulled over from [facility]. Breast US done on 3/28/2024. No mammogram done, US only.

## 2024-11-27 ENCOUNTER — PATIENT MESSAGE (OUTPATIENT)
Dept: ADMINISTRATIVE | Facility: OTHER | Age: 44
End: 2024-11-27
Payer: COMMERCIAL

## 2024-12-09 ENCOUNTER — E-VISIT (OUTPATIENT)
Dept: INTERNAL MEDICINE | Facility: CLINIC | Age: 44
End: 2024-12-09
Payer: COMMERCIAL

## 2024-12-09 ENCOUNTER — PATIENT MESSAGE (OUTPATIENT)
Dept: INTERNAL MEDICINE | Facility: CLINIC | Age: 44
End: 2024-12-09

## 2024-12-09 DIAGNOSIS — R89.9 ABNORMAL LABORATORY TEST RESULT: Primary | ICD-10-CM

## 2024-12-09 NOTE — PROGRESS NOTES
Patient ID: Cindy Alan is a 44 y.o. female.    Chief Complaint: General Illness (Entered automatically based on patient selection in [employer].)    The patient initiated a request through [employer] on 12/9/2024 for evaluation and management with a chief complaint of General Illness (Entered automatically based on patient selection in [employer].)     I evaluated the questionnaire submission on 12/9/24.    [facility] Peq Evisit Supergroup-Common Problems    12/9/2024  8:34 AM CST - Filed by Patient   What do you need help with? Other Concern   Do you agree to participate in an E-Visit? Yes   If you have any of the following symptoms, please present to your local emergency room or call 911:  I acknowledge   Do you have any of the following pregnancy-related conditions? None   What is the main issue you would like addressed today? high leukocytes bloodwork   Please describe your symptoms high leukocytes bloodwork   Where is your problem located? bloodwork   How severe are your symptoms? Mild   Have you had these symptoms before? Yes   How long have you been having these symptoms? For a few days   Please list any medications or treatments you have used for your condition and indicate if it was effective or not. n/a   What makes this feel better? n/a   What makes this feel worse? n/a   Are these symptoms related to a condition that you currently have? I am not sure   What is the condition?    When were you last seen for this condition?    Please describe any probable cause for these symptoms unknown   Provide any additional information you feel is important. recent bloodwork revealed high leukocytes   Please attach any relevant images or files    Are you able to take your vital signs? No         Encounter Diagnosis   Name Primary?    Abnormal laboratory test result Yes        No orders of the defined types were placed in this encounter.           No follow-ups on file.      E-Visit Time Tracking: 10    Cindy was seen today  for general illness.    Diagnoses and all orders for this visit:    Abnormal laboratory test result

## 2025-01-28 ENCOUNTER — HOSPITAL ENCOUNTER (EMERGENCY)
Facility: HOSPITAL | Age: 45
Discharge: HOME | End: 2025-01-28
Attending: EMERGENCY MEDICINE
Payer: COMMERCIAL

## 2025-01-28 VITALS
HEART RATE: 75 BPM | SYSTOLIC BLOOD PRESSURE: 123 MMHG | BODY MASS INDEX: 36.4 KG/M2 | DIASTOLIC BLOOD PRESSURE: 68 MMHG | HEIGHT: 64 IN | RESPIRATION RATE: 12 BRPM | OXYGEN SATURATION: 99 % | WEIGHT: 213.19 LBS | TEMPERATURE: 98 F

## 2025-01-28 DIAGNOSIS — R82.90 ABNORMAL URINE: ICD-10-CM

## 2025-01-28 DIAGNOSIS — R10.30 LOWER ABDOMINAL PAIN: Primary | ICD-10-CM

## 2025-01-28 LAB
ALBUMIN SERPL BCP-MCNC: 4.2 G/DL (ref 3.5–5.2)
ALP SERPL-CCNC: 80 U/L (ref 40–150)
ALT SERPL W/O P-5'-P-CCNC: 17 U/L (ref 10–44)
ANION GAP SERPL CALC-SCNC: 15 MMOL/L (ref 8–16)
AST SERPL-CCNC: 16 U/L (ref 10–40)
B-HCG UR QL: NEGATIVE
BASOPHILS # BLD AUTO: 0.05 K/UL (ref 0–0.2)
BASOPHILS NFR BLD: 0.6 % (ref 0–1.9)
BILIRUB SERPL-MCNC: 0.2 MG/DL (ref 0.1–1)
BILIRUB UR QL STRIP: NEGATIVE
BUN SERPL-MCNC: 15 MG/DL (ref 6–20)
CALCIUM SERPL-MCNC: 9.7 MG/DL (ref 8.7–10.5)
CHLORIDE SERPL-SCNC: 102 MMOL/L (ref 95–110)
CLARITY UR: CLEAR
CO2 SERPL-SCNC: 19 MMOL/L (ref 23–29)
COLOR UR: COLORLESS
CREAT SERPL-MCNC: 1.1 MG/DL (ref 0.5–1.4)
DIFFERENTIAL METHOD BLD: NORMAL
EOSINOPHIL # BLD AUTO: 0.3 K/UL (ref 0–0.5)
EOSINOPHIL NFR BLD: 3.6 % (ref 0–8)
ERYTHROCYTE [DISTWIDTH] IN BLOOD BY AUTOMATED COUNT: 12.4 % (ref 11.5–14.5)
EST. GFR  (NO RACE VARIABLE): >60 ML/MIN/1.73 M^2
GLUCOSE SERPL-MCNC: 93 MG/DL (ref 70–110)
GLUCOSE UR QL STRIP: NEGATIVE
HCT VFR BLD AUTO: 39.6 % (ref 37–48.5)
HCV AB SERPL QL IA: NEGATIVE
HEP C VIRUS HOLD SPECIMEN: NORMAL
HGB BLD-MCNC: 13.1 G/DL (ref 12–16)
HGB UR QL STRIP: NEGATIVE
HIV 1+2 AB+HIV1 P24 AG SERPL QL IA: NEGATIVE
IMM GRANULOCYTES # BLD AUTO: 0.02 K/UL (ref 0–0.04)
IMM GRANULOCYTES NFR BLD AUTO: 0.2 % (ref 0–0.5)
KETONES UR QL STRIP: NEGATIVE
LEUKOCYTE ESTERASE UR QL STRIP: ABNORMAL
LIPASE SERPL-CCNC: 34 U/L (ref 4–60)
LYMPHOCYTES # BLD AUTO: 3.9 K/UL (ref 1–4.8)
LYMPHOCYTES NFR BLD: 45.3 % (ref 18–48)
MCH RBC QN AUTO: 29.2 PG (ref 27–31)
MCHC RBC AUTO-ENTMCNC: 33.1 G/DL (ref 32–36)
MCV RBC AUTO: 88 FL (ref 82–98)
MICROSCOPIC COMMENT: NORMAL
MONOCYTES # BLD AUTO: 0.6 K/UL (ref 0.3–1)
MONOCYTES NFR BLD: 7.3 % (ref 4–15)
NEUTROPHILS # BLD AUTO: 3.7 K/UL (ref 1.8–7.7)
NEUTROPHILS NFR BLD: 43 % (ref 38–73)
NITRITE UR QL STRIP: NEGATIVE
NRBC BLD-RTO: 0 /100 WBC
PH UR STRIP: 8 [PH] (ref 5–8)
PLATELET # BLD AUTO: 259 K/UL (ref 150–450)
PMV BLD AUTO: 11.1 FL (ref 9.2–12.9)
POTASSIUM SERPL-SCNC: 3.7 MMOL/L (ref 3.5–5.1)
PROT SERPL-MCNC: 7.6 G/DL (ref 6–8.4)
PROT UR QL STRIP: NEGATIVE
RBC # BLD AUTO: 4.49 M/UL (ref 4–5.4)
RBC #/AREA URNS HPF: 1 /HPF (ref 0–4)
SODIUM SERPL-SCNC: 136 MMOL/L (ref 136–145)
SP GR UR STRIP: 1.01 (ref 1–1.03)
SQUAMOUS #/AREA URNS HPF: 1 /HPF
URN SPEC COLLECT METH UR: ABNORMAL
UROBILINOGEN UR STRIP-ACNC: NEGATIVE EU/DL
WBC # BLD AUTO: 8.68 K/UL (ref 3.9–12.7)
WBC #/AREA URNS HPF: 4 /HPF (ref 0–5)

## 2025-01-28 PROCEDURE — 96374 THER/PROPH/DIAG INJ IV PUSH: CPT

## 2025-01-28 PROCEDURE — 86803 HEPATITIS C AB TEST: CPT | Performed by: EMERGENCY MEDICINE

## 2025-01-28 PROCEDURE — 99285 EMERGENCY DEPT VISIT HI MDM: CPT | Mod: 25

## 2025-01-28 PROCEDURE — 80053 COMPREHEN METABOLIC PANEL: CPT | Performed by: EMERGENCY MEDICINE

## 2025-01-28 PROCEDURE — 96375 TX/PRO/DX INJ NEW DRUG ADDON: CPT

## 2025-01-28 PROCEDURE — 96372 THER/PROPH/DIAG INJ SC/IM: CPT | Performed by: EMERGENCY MEDICINE

## 2025-01-28 PROCEDURE — 25500020 PHARM REV CODE 255: Performed by: EMERGENCY MEDICINE

## 2025-01-28 PROCEDURE — 83690 ASSAY OF LIPASE: CPT | Performed by: EMERGENCY MEDICINE

## 2025-01-28 PROCEDURE — 63600175 PHARM REV CODE 636 W HCPCS: Performed by: EMERGENCY MEDICINE

## 2025-01-28 PROCEDURE — 81025 URINE PREGNANCY TEST: CPT | Performed by: EMERGENCY MEDICINE

## 2025-01-28 PROCEDURE — 81000 URINALYSIS NONAUTO W/SCOPE: CPT | Performed by: EMERGENCY MEDICINE

## 2025-01-28 PROCEDURE — 87389 HIV-1 AG W/HIV-1&-2 AB AG IA: CPT | Performed by: EMERGENCY MEDICINE

## 2025-01-28 PROCEDURE — 85025 COMPLETE CBC W/AUTO DIFF WBC: CPT | Performed by: EMERGENCY MEDICINE

## 2025-01-28 RX ORDER — KETOROLAC TROMETHAMINE 30 MG/ML
15 INJECTION, SOLUTION INTRAMUSCULAR; INTRAVENOUS
Status: COMPLETED | OUTPATIENT
Start: 2025-01-28 | End: 2025-01-28

## 2025-01-28 RX ORDER — ONDANSETRON 4 MG/1
4 TABLET, FILM COATED ORAL EVERY 6 HOURS PRN
Qty: 12 TABLET | Refills: 0 | Status: SHIPPED | OUTPATIENT
Start: 2025-01-28

## 2025-01-28 RX ORDER — MORPHINE SULFATE 4 MG/ML
4 INJECTION, SOLUTION INTRAMUSCULAR; INTRAVENOUS
Status: COMPLETED | OUTPATIENT
Start: 2025-01-28 | End: 2025-01-28

## 2025-01-28 RX ORDER — DICYCLOMINE HYDROCHLORIDE 20 MG/1
20 TABLET ORAL 2 TIMES DAILY
Qty: 20 TABLET | Refills: 0 | Status: SHIPPED | OUTPATIENT
Start: 2025-01-28 | End: 2025-02-27

## 2025-01-28 RX ORDER — CIPROFLOXACIN 500 MG/1
500 TABLET ORAL EVERY 12 HOURS
Qty: 10 TABLET | Refills: 0 | Status: SHIPPED | OUTPATIENT
Start: 2025-01-28 | End: 2025-02-02

## 2025-01-28 RX ORDER — DICYCLOMINE HYDROCHLORIDE 10 MG/ML
20 INJECTION INTRAMUSCULAR
Status: COMPLETED | OUTPATIENT
Start: 2025-01-28 | End: 2025-01-28

## 2025-01-28 RX ORDER — ONDANSETRON HYDROCHLORIDE 2 MG/ML
4 INJECTION, SOLUTION INTRAVENOUS
Status: COMPLETED | OUTPATIENT
Start: 2025-01-28 | End: 2025-01-28

## 2025-01-28 RX ADMIN — MORPHINE SULFATE 4 MG: 4 INJECTION INTRAVENOUS at 12:01

## 2025-01-28 RX ADMIN — IOHEXOL 100 ML: 350 INJECTION, SOLUTION INTRAVENOUS at 02:01

## 2025-01-28 RX ADMIN — DICYCLOMINE HYDROCHLORIDE 20 MG: 20 INJECTION, SOLUTION INTRAMUSCULAR at 03:01

## 2025-01-28 RX ADMIN — ONDANSETRON 4 MG: 2 INJECTION INTRAMUSCULAR; INTRAVENOUS at 12:01

## 2025-01-28 RX ADMIN — KETOROLAC TROMETHAMINE 15 MG: 30 INJECTION, SOLUTION INTRAMUSCULAR at 12:01

## 2025-01-28 NOTE — ED PROVIDER NOTES
SCRIBE #1 NOTE: I, Tomás Gorman, am scribing for, and in the presence of, Milena Gama MD. I have scribed the entire note.       History     Chief Complaint   Patient presents with    Abdominal Pain     Per patient, sudden onset of pelvic pain after using the restroom today; patient also c/o dizziness     Review of patient's allergies indicates:   Allergen Reactions    Neomycin Swelling    Penicillin Swelling    Cefdinir      Other Reaction(s): ABDOMEN PAIN    Reports abdominal pain    Adhesive tape-silicones     Neomycin-polymyxin b-dexameth      Eye cream    Amoxicillin Rash    Azithromycin Rash    Latex, natural rubber Rash         History of Present Illness     HPI    1/28/2025, 12:21 PM  History obtained from the patient      History of Present Illness: Cindy Alan is a 44 y.o. female patient with a PMHx of asthma and HTN who presents to the Emergency Department for evaluation of suprapubic abdominal pain which onset gradually this AM. Pt says pain has not abated since bowel movement and reminds her of a cyst rupture. Pt denies any hx of kidney stones or abdominal surgical hx. Pt has eaten nothing this AM besides coffee (pt says this is normal). Symptoms are constant and moderate in severity. No mitigating or exacerbating factors reported. Associated sxs include dizziness. Patient denies any n/v, dysuria, constipation, and all other sxs at this time. No prior tx reported. No further complaints or concerns at this time.       Arrival mode: Personal vehicle      PCP: Vilma Phillip DO        Past Medical History:  Past Medical History:   Diagnosis Date    Asthma     Essential (primary) hypertension        Past Surgical History:  Past Surgical History:   Procedure Laterality Date    DILATION AND CURETTAGE OF UTERUS           Family History:  Family History   Problem Relation Name Age of Onset    Kidney disease Mother janusz     Diabetes Mother janusz     Hypertension Mother janusz     Thyroiditis  Mother janusz     Cataracts Mother janusz     Birth defects Mother janusz     Heart disease Mother janusz     Vision loss Mother janusz     Hypertension Maternal Grandfather Pan     Asthma Maternal Grandmother miya     Heart disease Maternal Grandmother miya     Hypertension Maternal Grandmother miya     Miscarriages / Stillbirths Maternal Grandmother miya     Diabetes Maternal Aunt Elenora     Heart disease Maternal Aunt Elenora     Hypertension Maternal Aunt Elenora     Kidney disease Maternal Aunt Elenora     Vision loss Maternal Aunt Elenora     Heart disease Maternal Uncle Vance     Hypertension Maternal Uncle Vance        Social History:  Social History     Tobacco Use    Smoking status: Never     Passive exposure: Never    Smokeless tobacco: Never   Substance and Sexual Activity    Alcohol use: Not Currently    Drug use: Never    Sexual activity: Yes     Partners: Male     Birth control/protection: None        Review of Systems     Review of Systems   Constitutional:  Negative for fever.   HENT:  Negative for sore throat.    Respiratory:  Negative for shortness of breath.    Cardiovascular:  Negative for chest pain.   Gastrointestinal:  Positive for abdominal pain (suprapubic). Negative for constipation, nausea and vomiting.   Genitourinary:  Negative for dysuria.   Musculoskeletal:  Negative for back pain.   Skin:  Negative for rash.   Neurological:  Positive for dizziness. Negative for weakness.   Hematological:  Does not bruise/bleed easily.   All other systems reviewed and are negative.       Physical Exam     Initial Vitals [01/28/25 1212]   BP Pulse Resp Temp SpO2   (!) 179/89 87 17 98.2 °F (36.8 °C) 100 %      MAP       --          Physical Exam  Nursing Notes and Vital Signs Reviewed.  Constitutional: Patient is in mild distress. Well-developed and well-nourished.  Head: Atraumatic. Normocephalic.  Eyes: PERRL. EOM intact. Conjunctivae are not pale. No scleral icterus.  ENT: Mucous membranes are  "moist. Oropharynx is clear and symmetric.    Neck: Supple. Full ROM. No lymphadenopathy.  Cardiovascular: Regular rate. Regular rhythm. No murmurs, rubs, or gallops. Distal pulses are 2+ and symmetric.  Pulmonary/Chest: No respiratory distress. Clear to auscultation bilaterally. No wheezing or rales.  Abdominal: Soft and non-distended.  Suprapubic tenderness.  No rebound, guarding, or rigidity. Good bowel sounds.  Genitourinary: No CVA tenderness  Musculoskeletal: Moves all extremities. No obvious deformities. No edema. No calf tenderness.  Skin: Warm and dry.  Neurological:  Alert, awake, and appropriate.  Normal speech.  No acute focal neurological deficits are appreciated.  Psychiatric: Normal affect. Good eye contact. Appropriate in content.     ED Course   Procedures  ED Vital Signs:  Vitals:    01/28/25 1212 01/28/25 1230 01/28/25 1237 01/28/25 1300   BP: (!) 179/89 (!) 148/108  (!) 157/81   Pulse: 87 85  79   Resp: 17 20 (!) 22 (!) 21   Temp: 98.2 °F (36.8 °C)      TempSrc: Oral      SpO2: 100% 100%     Weight: 96.7 kg (213 lb 3 oz)      Height: 5' 4" (1.626 m)       01/28/25 1330 01/28/25 1402 01/28/25 1540   BP: (!) 151/92 138/87 123/68   Pulse: 80 79 75   Resp: 20 12    Temp:      TempSrc:      SpO2:  96% 99%   Weight:      Height:          Abnormal Lab Results:  Labs Reviewed   COMPREHENSIVE METABOLIC PANEL - Abnormal       Result Value    Sodium 136      Potassium 3.7      Chloride 102      CO2 19 (*)     Glucose 93      BUN 15      Creatinine 1.1      Calcium 9.7      Total Protein 7.6      Albumin 4.2      Total Bilirubin 0.2      Alkaline Phosphatase 80      AST 16      ALT 17      eGFR >60      Anion Gap 15      Narrative:     Release to patient->Immediate  For upper or mid abdominal pain.   URINALYSIS, REFLEX TO URINE CULTURE - Abnormal    Specimen UA Urine, Clean Catch      Color, UA Colorless (*)     Appearance, UA Clear      pH, UA 8.0      Specific Gravity, UA 1.010      Protein, UA Negative   "    Glucose, UA Negative      Ketones, UA Negative      Bilirubin (UA) Negative      Occult Blood UA Negative      Nitrite, UA Negative      Urobilinogen, UA Negative      Leukocytes, UA 2+ (*)     Narrative:     In and Out Cath as needed it patient unable to void  Specimen Source->Urine   HEPATITIS C ANTIBODY    Hepatitis C Ab Negative      Narrative:     Release to patient->Immediate  For upper or mid abdominal pain.   HEP C VIRUS HOLD SPECIMEN    HEP C Virus Hold Specimen Hold for HCV sendout      Narrative:     Release to patient->Immediate  For upper or mid abdominal pain.   HIV 1 / 2 ANTIBODY    HIV 1/2 Ag/Ab Negative      Narrative:     Release to patient->Immediate  For upper or mid abdominal pain.   CBC W/ AUTO DIFFERENTIAL    WBC 8.68      RBC 4.49      Hemoglobin 13.1      Hematocrit 39.6      MCV 88      MCH 29.2      MCHC 33.1      RDW 12.4      Platelets 259      MPV 11.1      Immature Granulocytes 0.2      Gran # (ANC) 3.7      Immature Grans (Abs) 0.02      Lymph # 3.9      Mono # 0.6      Eos # 0.3      Baso # 0.05      nRBC 0      Gran % 43.0      Lymph % 45.3      Mono % 7.3      Eosinophil % 3.6      Basophil % 0.6      Differential Method Automated      Narrative:     Release to patient->Immediate  For upper or mid abdominal pain.   LIPASE    Lipase 34      Narrative:     Release to patient->Immediate  For upper or mid abdominal pain.   PREGNANCY TEST, URINE RAPID    Preg Test, Ur Negative      Narrative:     Specimen Source->Urine   URINALYSIS MICROSCOPIC    RBC, UA 1      WBC, UA 4      Squam Epithel, UA 1      Microscopic Comment SEE COMMENT      Narrative:     In and Out Cath as needed it patient unable to void  Specimen Source->Urine        All Lab Results:  Results for orders placed or performed during the hospital encounter of 01/28/25   Hepatitis C Antibody    Collection Time: 01/28/25 12:20 PM   Result Value Ref Range    Hepatitis C Ab Negative Negative   HCV Virus Hold Specimen     Collection Time: 01/28/25 12:20 PM   Result Value Ref Range    HEP C Virus Hold Specimen Hold for HCV sendout    HIV 1/2 Ag/Ab (4th Gen)    Collection Time: 01/28/25 12:20 PM   Result Value Ref Range    HIV 1/2 Ag/Ab Negative Negative   CBC auto differential    Collection Time: 01/28/25 12:20 PM   Result Value Ref Range    WBC 8.68 3.90 - 12.70 K/uL    RBC 4.49 4.00 - 5.40 M/uL    Hemoglobin 13.1 12.0 - 16.0 g/dL    Hematocrit 39.6 37.0 - 48.5 %    MCV 88 82 - 98 fL    MCH 29.2 27.0 - 31.0 pg    MCHC 33.1 32.0 - 36.0 g/dL    RDW 12.4 11.5 - 14.5 %    Platelets 259 150 - 450 K/uL    MPV 11.1 9.2 - 12.9 fL    Immature Granulocytes 0.2 0.0 - 0.5 %    Gran # (ANC) 3.7 1.8 - 7.7 K/uL    Immature Grans (Abs) 0.02 0.00 - 0.04 K/uL    Lymph # 3.9 1.0 - 4.8 K/uL    Mono # 0.6 0.3 - 1.0 K/uL    Eos # 0.3 0.0 - 0.5 K/uL    Baso # 0.05 0.00 - 0.20 K/uL    nRBC 0 0 /100 WBC    Gran % 43.0 38.0 - 73.0 %    Lymph % 45.3 18.0 - 48.0 %    Mono % 7.3 4.0 - 15.0 %    Eosinophil % 3.6 0.0 - 8.0 %    Basophil % 0.6 0.0 - 1.9 %    Differential Method Automated    Comprehensive metabolic panel    Collection Time: 01/28/25 12:20 PM   Result Value Ref Range    Sodium 136 136 - 145 mmol/L    Potassium 3.7 3.5 - 5.1 mmol/L    Chloride 102 95 - 110 mmol/L    CO2 19 (L) 23 - 29 mmol/L    Glucose 93 70 - 110 mg/dL    BUN 15 6 - 20 mg/dL    Creatinine 1.1 0.5 - 1.4 mg/dL    Calcium 9.7 8.7 - 10.5 mg/dL    Total Protein 7.6 6.0 - 8.4 g/dL    Albumin 4.2 3.5 - 5.2 g/dL    Total Bilirubin 0.2 0.1 - 1.0 mg/dL    Alkaline Phosphatase 80 40 - 150 U/L    AST 16 10 - 40 U/L    ALT 17 10 - 44 U/L    eGFR >60 >60 mL/min/1.73 m^2    Anion Gap 15 8 - 16 mmol/L   Lipase    Collection Time: 01/28/25 12:20 PM   Result Value Ref Range    Lipase 34 4 - 60 U/L   Urinalysis, Reflex to Urine Culture Urine, Clean Catch    Collection Time: 01/28/25  1:01 PM    Specimen: Urine   Result Value Ref Range    Specimen UA Urine, Clean Catch     Color, UA Colorless (A)  Yellow, Straw, Madina    Appearance, UA Clear Clear    pH, UA 8.0 5.0 - 8.0    Specific Gravity, UA 1.010 1.005 - 1.030    Protein, UA Negative Negative    Glucose, UA Negative Negative    Ketones, UA Negative Negative    Bilirubin (UA) Negative Negative    Occult Blood UA Negative Negative    Nitrite, UA Negative Negative    Urobilinogen, UA Negative <2.0 EU/dL    Leukocytes, UA 2+ (A) Negative   Pregnancy, urine rapid (UPT)    Collection Time: 01/28/25  1:01 PM   Result Value Ref Range    Preg Test, Ur Negative    Urinalysis Microscopic    Collection Time: 01/28/25  1:01 PM   Result Value Ref Range    RBC, UA 1 0 - 4 /hpf    WBC, UA 4 0 - 5 /hpf    Squam Epithel, UA 1 /hpf    Microscopic Comment SEE COMMENT          Imaging Results:  Imaging Results              CT Abdomen Pelvis With IV Contrast NO Oral Contrast (Final result)  Result time 01/28/25 14:45:55      Final result by Sorin Pierson MD (01/28/25 14:45:55)                   Impression:      1.  Negative for acute process involving the abdomen or pelvis.  Normal appendix.  Negative for renal stone disease.  Negative for free air.  Negative for inflammatory changes.    2. Portions of the left ureter are mildly dilated, without obvious obstructing process.  The clinical significance of this finding is unknown.  Personal history of renal stone disease?    3.  Large L5/S1 disc bulge.  Clinical correlation is advised.  As clinically warranted, nonemergent lumbar spine MRI may be helpful to further evaluate.    4.  2.3 cm left ovarian follicle or corpus luteum cyst.  Hepatomegaly versus Riedel's lobe with fatty infiltration of the liver.  Subcentimeter hepatic cysts.  Other nonemergent findings as described above.    All CT scans at this facility are performed  using dose modulation techniques as appropriate to performed exam including the following:  automated exposure control; adjustment of mA and/or kV according to the patients size (this includes  techniques or standardized protocols for targeted exams where dose is matched to indication/reason for exam: i.e. extremities or head);  iterative reconstruction technique.      Electronically signed by: Sorin Pierson MD  Date:    01/28/2025  Time:    14:45               Narrative:    EXAMINATION:  CT ABDOMEN PELVIS WITH IV CONTRAST, multiplanar reconstructions    CLINICAL HISTORY:  RLQ abdominal pain (Age >= 14y);    TECHNIQUE:  Axial images through the abdomen and pelvis were obtained with the use of IV contrast.  Sagittal and coronal reconstructions are provided for review.  Oral contrast was not utilized.    COMPARISON:  Abdominal x-ray performed earlier the same day    FINDINGS:  LUNG BASES:   Lung bases are clear.  Negative for pleural or pericardial effusions. The distal esophagus is normal.  Negative for coronary artery calcifications.    ABDOMEN: Fatty infiltration of the liver.  Accessory spleen inferior to the spleen.  Hepatomegaly versus Riedel's lobe.  Mild right extrarenal pelvis.  Mild prominence of portions of the left ureter without obvious obstructing process seen.  Posterior right hepatic lobe cyst measuring up to 8 mm.  The liver, spleen and gallbladder otherwise appear normal.  The pancreas is unremarkable.  Kidneys and adrenal glands are otherwise normal.  The biliary tree is normal.    Negative for adenopathy, ascites or inflammatory change noted within the abdomen or pelvis.  Negative for vascular abnormalities.  Portal vein is patent.    Lipomatous hypertrophy of the ileocecal valve.  Normal appendix.  Appropriate stool.  Negative for dilated loops of large or small bowel.  Negative for free air.    PELVIS: The urinary bladder is unremarkable.  2.3 cm left ovarian follicle or corpus luteum cyst.  The rest of the female pelvic organs are normal. There are pelvic phleboliths.    Fat filled umbilical hernia.  Laxity of the linea alba.  The abdominal wall is otherwise intact.    No significant  osseous abnormality is identified.  Degenerative facet arthropathy.  Multilevel marginal spondylosis.  Large L5/S1 disc bulge.  Bone islands within the left hemipelvis.    Negative for groin adenopathy.                                       X-Ray Abdomen Flat And Erect (Final result)  Result time 01/28/25 13:34:20      Final result by Iain Carey MD (Timothy) (01/28/25 13:34:20)                   Impression:      Negative two-view abdominal series.      Electronically signed by: Iain Carye MD  Date:    01/28/2025  Time:    13:34               Narrative:    EXAMINATION:  XR ABDOMEN FLAT AND ERECT    CLINICAL HISTORY:  Lower abdominal pain, unspecified    COMPARISON:  None.    FINDINGS:  Two views of the abdomen. The bowel gas pattern is unremarkable. No obstruction, ileus or free air.    Bony structures are grossly normal.                                                The Emergency Provider reviewed the vital signs and test results, which are outlined above.     ED Discussion       3:19 PM: Reassessed pt at this time. Discussed with pt all pertinent ED information and results. Discussed pt dx and plan of tx. Gave pt all f/u and return to the ED instructions. All questions and concerns were addressed at this time. Pt expresses understanding of information and instructions, and is comfortable with plan to discharge. Pt is stable for discharge.    I discussed with patient and/or family/caretaker that evaluation in the ED does not suggest any emergent or life threatening medical conditions requiring immediate intervention beyond what was provided in the ED, and I believe patient is safe for discharge.  Regardless, an unremarkable evaluation in the ED does not preclude the development or presence of a serious of life threatening condition. As such, patient was instructed to return immediately for any worsening or change in current symptoms.         Medical Decision Making  DDX: 1. Bladder stone 2. Ureteral  Stone 3. Ruptured Cyst    Lab work reviewed and otherwise normal except mild UTI, xray abdomen negative, CT scan reviewed and stable ovarian cyst, large disk bulge L5/S1, patient without any back pain or neurological symptoms, given pain control with relief, will tx for UTI but cause of pain unclear, patient feels comfortable with discharge, reasons to return given.     Amount and/or Complexity of Data Reviewed  Labs: ordered. Decision-making details documented in ED Course.  Radiology: ordered. Decision-making details documented in ED Course.    Risk  Prescription drug management.                ED Medication(s):  Medications   morphine injection 4 mg (4 mg Intravenous Given 1/28/25 1237)   ketorolac injection 15 mg (15 mg Intravenous Given 1/28/25 1235)   ondansetron injection 4 mg (4 mg Intravenous Given 1/28/25 1236)   dicyclomine injection 20 mg (20 mg Intramuscular Given 1/28/25 1540)   iohexoL (OMNIPAQUE 350) injection 100 mL (100 mLs Intravenous Given 1/28/25 1437)       Discharge Medication List as of 1/28/2025  3:19 PM        START taking these medications    Details   ciprofloxacin HCl (CIPRO) 500 MG tablet Take 1 tablet (500 mg total) by mouth every 12 (twelve) hours. for 5 days, Starting Tue 1/28/2025, Until Sun 2/2/2025, Normal      dicyclomine (BENTYL) 20 mg tablet Take 1 tablet (20 mg total) by mouth 2 (two) times daily., Starting Tue 1/28/2025, Until u 2/27/2025, Normal      ondansetron (ZOFRAN) 4 MG tablet Take 1 tablet (4 mg total) by mouth every 6 (six) hours as needed., Starting Tue 1/28/2025, Normal              Follow-up Information       Vilma Phillip DO. Schedule an appointment as soon as possible for a visit in 2 days.    Specialty: Internal Medicine  Why: Return to the Emergency Room, If symptoms worsen  Contact information:  [address] [place] [address] [address] [place] [id number]  [phone]                                 Scribe Attestation:   Scribe #1: I performed the above scribed service  and the documentation accurately describes the services I performed. I attest to the accuracy of the note.     Attending:   Physician Attestation Statement for Scribe #1: I, Milena Gama MD, personally performed the services described in this documentation, as scribed by Tomás Gorman, in my presence, and it is both accurate and complete.           Clinical Impression       ICD-10-CM ICD-9-CM   1. Lower abdominal pain  R10.30 789.09   2. Abnormal urine  R82.90 791.9       Disposition:   Disposition: Discharged  Condition: Stable        Milena Gama MD  02/02/25 0952

## 2025-01-28 NOTE — Clinical Note
"Cindy"Cindy" Waqas was seen and treated in our emergency department on 1/28/2025.  She may return to work on 01/30/2025.       If you have any questions or concerns, please don't hesitate to call.      Milena Gama MD RN    "

## 2025-01-28 NOTE — Clinical Note
"Cindy"Cindy" Waqas was seen and treated in our emergency department on 1/28/2025.  She may return to work on 01/29/2025.       If you have any questions or concerns, please don't hesitate to call.      Milena Gama MD RN    "

## 2025-01-30 ENCOUNTER — OFFICE VISIT (OUTPATIENT)
Dept: INTERNAL MEDICINE | Facility: CLINIC | Age: 45
End: 2025-01-30
Payer: COMMERCIAL

## 2025-01-30 DIAGNOSIS — K42.9 UMBILICAL HERNIA WITHOUT OBSTRUCTION AND WITHOUT GANGRENE: ICD-10-CM

## 2025-01-30 DIAGNOSIS — R10.33 PERIUMBILICAL ABDOMINAL PAIN: Primary | ICD-10-CM

## 2025-01-30 DIAGNOSIS — N83.12 CORPUS LUTEUM CYST OF LEFT OVARY: ICD-10-CM

## 2025-01-30 DIAGNOSIS — K76.0 FATTY LIVER: ICD-10-CM

## 2025-01-30 NOTE — PROGRESS NOTES
The patient location is: [place]   The chief complaint leading to consultation is: ER visit follow up/abdominal pain    Visit type: audiovisual    Face to Face time with patient: 12 minutes  12 minutes of total time spent on the encounter, which includes face to face time and non-face to face time preparing to see the patient (eg, review of tests), Obtaining and/or reviewing separately obtained history, Documenting clinical information in the electronic or other health record, Independently interpreting results (not separately reported) and communicating results to the patient/family/caregiver, or Care coordination (not separately reported).         Each patient to whom he or she provides medical services by telemedicine is:  (1) informed of the relationship between the physician and patient and the respective role of any other health care provider with respect to management of the patient; and (2) notified that he or she may decline to receive medical services by telemedicine and may withdraw from such care at any time.    Notes:     History of Present Illness    CHIEF COMPLAINT:  Ms. Alan presents today for follow up after recent ER visit for severe lower abdominal pain    HISTORY OF PRESENT ILLNESS:  She recently visited the ER for severe lower abdominal pain that was constant and sharp in nature, with such intensity that she was unable to stand upright. Associated symptoms included sweating and near-syncope. A normal bowel movement during the episode did not provide relief. The pain has since migrated to the mid abdomen, which she describes as a knotting sensation. She experiences increased gas immediately following food consumption.    MEDICAL HISTORY:  She has a history of childhood urethral condition affecting urine flow. She has reflux which she manages through dietary modifications without medications.    MEDICATIONS:  She is currently on a 5-day course of antibiotics prescribed from the ER. She was  prescribed dicyclomine for cramping but has not taken it yet.         Review of Systems   Constitutional:  Negative for fever and weight loss.   Gastrointestinal:  Positive for abdominal pain. Negative for constipation, diarrhea, melena, nausea and vomiting.   Genitourinary:  Positive for frequency. Negative for dysuria and hematuria.   Musculoskeletal:  Negative for myalgias.   Neurological:  Negative for headaches.     Answers submitted by the patient for this visit:  Abdominal Pain Questionnaire (Submitted on 1/30/2025)  Chief Complaint: Abdominal pain  Chronicity: new  Onset: in the past 7 days  Onset quality: sudden  Frequency: intermittently  Episode duration: 2 Hours  Pain location: suprapubic region  Pain - numeric: 10/10  Pain quality: sharp  anorexia: No  arthralgias: No  belching: No  flatus: Yes  hematochezia: No  Aggravated by: movement, urination  Relieved by: nothing, urination  Diagnostic workup: CT scan  Pain severity: severe  Treatments tried: nothing  Improvement on treatment: significant  abdominal surgery: No  colon cancer: No  Crohn's disease: No  gallstones: No  GERD: Yes  irritable bowel syndrome: No  UTI: Yes      Current Outpatient Medications:     azelastine (ASTELIN) 137 mcg (0.1 %) nasal spray, 1 spray (137 mcg total) by Nasal route 2 (two) times daily., Disp: 30 mL, Rfl: 0    cetirizine (ZYRTEC) 10 MG tablet, Take 1 tablet by mouth at bedtime. Before bath, Disp: 30 tablet, Rfl: 5    ciprofloxacin HCl (CIPRO) 500 MG tablet, Take 1 tablet (500 mg total) by mouth every 12 (twelve) hours. for 5 days, Disp: 10 tablet, Rfl: 0    dicyclomine (BENTYL) 20 mg tablet, Take 1 tablet (20 mg total) by mouth 2 (two) times daily., Disp: 20 tablet, Rfl: 0    fexofenadine (ALLEGRA) 180 MG tablet, Take 180 mg by mouth once daily., Disp: , Rfl:     fluticasone propionate (FLONASE) 50 mcg/actuation nasal spray, 2 sprays (100 mcg total) by Each Nostril route once daily., Disp: 48 mL, Rfl: 3     hydroCHLOROthiazide (HYDRODIURIL) 12.5 MG Tab, Take 1 tablet (12.5 mg total) by mouth once daily. (Patient not taking: Reported on 1/28/2025), Disp: 30 tablet, Rfl: 11    ondansetron (ZOFRAN) 4 MG tablet, Take 1 tablet (4 mg total) by mouth every 6 (six) hours as needed., Disp: 12 tablet, Rfl: 0    Review of patient's allergies indicates:   Allergen Reactions    Neomycin Swelling    Penicillin Swelling    Cefdinir      Other Reaction(s): ABDOMEN PAIN    Reports abdominal pain    Adhesive tape-silicones     Neomycin-polymyxin b-dexameth      Eye cream    Amoxicillin Rash    Azithromycin Rash    Latex, natural rubber Rash         PHYSICAL EXAM:   GENERAL: No apparent distress, alert and oriented x 3  RESPIRATORY: Respirations unlabored  PSYCH: Mood and affect appropriate    Assessment & Plan    RECENT ER VISIT::  - Continue the 5-day antibiotic course prescribed in ER for potential UTI despite mild urine abnormalities.  - Explained that cramping could be a side effect of antibiotics.  - Ms. Alan to provide an update on symptoms via message on Sunday or Monday after completing antibiotics.  - Advised to contact the office if symptoms persist or worsen after completion.    ABDOMINAL PAIN:  - Reviewed ER visit for severe lower abdominal pain.  - Considered possible causes including UTI, passed kidney stone, ovarian cyst, and umbilical hernia.  - Current symptoms include upper abdominal discomfort, gas, and cramping.  - Ms. Alan reported strong, consistent, sharp pain in the lower abdomen, severe enough to cause diaphoresis and near-syncope.  - Current pain is higher up in the abdomen, described as a knotting sensation.  - Prescribed Bentyl (dicyclomine) as needed for abdominal cramping.  - Noted patient's mention of possible history of kidney stones and urinary tract issues from childhood.  - Will reassess after antibiotic completion if further investigation is needed.    OVARIAN CYST:  - CT revealed a left corpus  leuteum cyst.  - While it could potentially cause pain, it's less likely due to the reported location of pain.  - Will reassess the need for further investigation after antibiotic completion.    FATTY LIVER:  - CT showed fatty liver.  - Evaluated that fatty liver should not cause pain, especially given its location.    UMBILICAL HERNIA:  - Ms. Alan reported occasional pain around the umbilical area.  - Assessed that the umbilical hernia may be an incidental finding and not the cause of the patient's main pain complaint.  - Will reassess the need for further investigation after antibiotic completion.    GAS/FLATULENCE:  - Ms. Alan reported experiencing significant flatulence, especially postprandially.  - Current symptoms include gas.

## 2025-02-11 ENCOUNTER — OFFICE VISIT (OUTPATIENT)
Dept: INTERNAL MEDICINE | Facility: CLINIC | Age: 45
End: 2025-02-11
Payer: COMMERCIAL

## 2025-02-11 DIAGNOSIS — J01.01 ACUTE RECURRENT MAXILLARY SINUSITIS: Primary | ICD-10-CM

## 2025-02-11 DIAGNOSIS — R05.1 ACUTE COUGH: ICD-10-CM

## 2025-02-11 DIAGNOSIS — I10 ESSENTIAL HYPERTENSION: ICD-10-CM

## 2025-02-11 DIAGNOSIS — R06.2 WHEEZING: ICD-10-CM

## 2025-02-11 DIAGNOSIS — J30.9 CHRONIC ALLERGIC RHINITIS: ICD-10-CM

## 2025-02-11 RX ORDER — PROMETHAZINE HYDROCHLORIDE AND DEXTROMETHORPHAN HYDROBROMIDE 6.25; 15 MG/5ML; MG/5ML
5 SYRUP ORAL EVERY 8 HOURS PRN
Qty: 118 ML | Refills: 0 | Status: SHIPPED | OUTPATIENT
Start: 2025-02-11 | End: 2025-02-21

## 2025-02-11 RX ORDER — METHYLPREDNISOLONE 4 MG/1
TABLET ORAL
Qty: 21 EACH | Refills: 0 | Status: SHIPPED | OUTPATIENT
Start: 2025-02-11 | End: 2025-03-04

## 2025-02-11 RX ORDER — ALBUTEROL SULFATE 90 UG/1
2 INHALANT RESPIRATORY (INHALATION) EVERY 6 HOURS PRN
Qty: 18 G | Refills: 0 | Status: SHIPPED | OUTPATIENT
Start: 2025-02-11 | End: 2026-02-11

## 2025-02-11 RX ORDER — DOXYCYCLINE 100 MG/1
100 CAPSULE ORAL 2 TIMES DAILY
Qty: 20 CAPSULE | Refills: 0 | Status: SHIPPED | OUTPATIENT
Start: 2025-02-11 | End: 2025-02-21

## 2025-02-11 NOTE — PROGRESS NOTES
Cindy Alan  44 y.o. Black or  female  [id number]    Vilma Phillip DO  Patient Care Team:  Vilma Phillip DO as PCP - General (Internal Medicine)    Chief Complaint:   Chief Complaint   Patient presents with    Nasal Congestion     Pt states its been on and off for 3 weeks        History of Present Illness:  Pt is here for congestion and is new to me.    She reports symptoms began two weeks ago while in [place] with a severe sinus headache. Although symptoms briefly improved, they returned with increased intensity. Current symptoms include coughing, sneezing, mild fever, chills, congestion, and audible wheezing during sleep. She notes increasing frequency of sinus infections, with symptoms being less controlled than previously.    She has a history of chronic rhinitis since childhood and asthma (currently inactive). She also reports recent urinary and GYN issues.    The following were reviewed: active problem list, medication list, allergies, family history, social history, and Health Maintenance.     History:  Past Medical History:   Diagnosis Date    Asthma     Essential (primary) hypertension      Past Surgical History:   Procedure Laterality Date    DILATION AND CURETTAGE OF UTERUS       Family History   Problem Relation Name Age of Onset    Kidney disease Mother janusz     Diabetes Mother janusz     Hypertension Mother janusz     Thyroiditis Mother janusz     Cataracts Mother janusz     Birth defects Mother janusz     Heart disease Mother janusz     Vision loss Mother janusz     Hypertension Maternal Grandfather Pan     Asthma Maternal Grandmother miya     Heart disease Maternal Grandmother miya     Hypertension Maternal Grandmother miya     Miscarriages / Stillbirths Maternal Grandmother miya     Diabetes Maternal Aunt Elenora     Heart disease Maternal Aunt Elenora     Hypertension Maternal Aunt Elenora     Kidney disease Maternal Aunt Elenora     Vision loss Maternal Aunt Elenora      Heart disease Maternal Uncle Vance     Hypertension Maternal Uncle Vance      Social History     Socioeconomic History    Marital status: Other   Tobacco Use    Smoking status: Never     Passive exposure: Never    Smokeless tobacco: Never   Substance and Sexual Activity    Alcohol use: Not Currently    Drug use: Never    Sexual activity: Yes     Partners: Male     Birth control/protection: None     Social Drivers of Health     Financial Resource Strain: Low Risk  (2/18/2025)    Overall Financial Resource Strain (CARDIA)     Difficulty of Paying Living Expenses: Not hard at all   Food Insecurity: No Food Insecurity (2/18/2025)    Hunger Vital Sign     Worried About Running Out of Food in the Last Year: Never true     Ran Out of Food in the Last Year: Never true   Transportation Needs: No Transportation Needs (2/18/2025)    PRAPARE - Transportation     Lack of Transportation (Medical): No     Lack of Transportation (Non-Medical): No   Physical Activity: Insufficiently Active (2/18/2025)    Exercise Vital Sign     Days of Exercise per Week: 1 day     Minutes of Exercise per Session: 30 min   Stress: No Stress Concern Present (2/18/2025)    [employer] - Occupational Stress Questionnaire     Feeling of Stress : Not at all   Housing Stability: Low Risk  (2/18/2025)    Housing Stability Vital Sign     Unable to Pay for Housing in the Last Year: No     Homeless in the Last Year: No     Patient Active Problem List   Diagnosis    Essential hypertension    Severe obesity (BMI 35.0-39.9) with comorbidity    Weakness of trunk musculature     Review of patient's allergies indicates:   Allergen Reactions    Neomycin Swelling    Penicillin Swelling    Cefdinir      Other Reaction(s): ABDOMEN PAIN    Reports abdominal pain    Adhesive tape-silicones     Neomycin-polymyxin b-dexameth      Eye cream    Amoxicillin Rash    Azithromycin Rash    Latex, natural rubber Rash       Health Maintenance  Health  Maintenance Due   Topic Date Due    Colorectal Cancer Screening  Never done    Mammogram  08/21/2024    Influenza Vaccine (1) 09/01/2024       Medications:  Current Outpatient Medications on File Prior to Visit   Medication Sig Dispense Refill    cetirizine (ZYRTEC) 10 MG tablet Take 1 tablet by mouth at bedtime. Before bath 30 tablet 5    dicyclomine (BENTYL) 20 mg tablet Take 1 tablet (20 mg total) by mouth 2 (two) times daily. 20 tablet 0    fexofenadine (ALLEGRA) 180 MG tablet Take 180 mg by mouth once daily.      fluticasone propionate (FLONASE) 50 mcg/actuation nasal spray 2 sprays (100 mcg total) by Each Nostril route once daily. 48 mL 3    hydroCHLOROthiazide (HYDRODIURIL) 12.5 MG Tab Take 1 tablet (12.5 mg total) by mouth once daily. 30 tablet 11    ondansetron (ZOFRAN) 4 MG tablet Take 1 tablet (4 mg total) by mouth every 6 (six) hours as needed. 12 tablet 0    azelastine (ASTELIN) 137 mcg (0.1 %) nasal spray 1 spray (137 mcg total) by Nasal route 2 (two) times daily. 30 mL 0     No current facility-administered medications on file prior to visit.       Medications have been reviewed and reconciled with patient at visit today.    Laboratory Reviewed: (Yes)  Lab Results   Component Value Date    WBC 8.68 01/28/2025    HGB 13.1 01/28/2025    HCT 39.6 01/28/2025     01/28/2025    CHOL 211 (H) 10/03/2024    TRIG 73 10/03/2024    HDL 48 10/03/2024    ALT 17 01/28/2025    AST 16 01/28/2025     01/28/2025    K 3.7 01/28/2025     01/28/2025    CREATININE 1.1 01/28/2025    BUN 15 01/28/2025    CO2 19 (L) 01/28/2025    TSH 1.092 10/03/2024    HGBA1C 5.3 10/03/2024       ROS:  Review of Systems   Constitutional:  Positive for chills and fever.   HENT:  Positive for congestion and sinus pain. Negative for sore throat.         (+) sneezing   Respiratory:  Positive for cough and wheezing. Negative for shortness of breath.    Cardiovascular:  Negative for chest pain and leg swelling.    Gastrointestinal:  Negative for abdominal pain, constipation, diarrhea and vomiting.   Skin:  Negative for rash.   Neurological:  Positive for headaches. Negative for dizziness and weakness.       Exam:  Vitals:    02/11/25 1401   BP: 122/66   Pulse: 94   Resp: 16   Temp: 98.8 °F (37.1 °C)     Weight: 94.4 kg (208 lb 1.8 oz)   Body mass index is 35.72 kg/m².    Physical Exam  Vitals reviewed.   Constitutional:       General: She is awake. She is not in acute distress.     Appearance: She is not ill-appearing.   HENT:      Nose:      Right Sinus: Maxillary sinus tenderness present. No frontal sinus tenderness.      Left Sinus: Maxillary sinus tenderness present. No frontal sinus tenderness.   Eyes:      Conjunctiva/sclera: Conjunctivae normal.   Cardiovascular:      Rate and Rhythm: Normal rate and regular rhythm.      Heart sounds: Normal heart sounds.   Pulmonary:      Effort: Pulmonary effort is normal. No respiratory distress.      Breath sounds: Normal breath sounds.   Musculoskeletal:         General: No swelling or deformity.   Skin:     General: Skin is warm and dry.   Neurological:      General: No focal deficit present.      Mental Status: She is alert.   Psychiatric:         Mood and Affect: Mood normal.         Behavior: Behavior normal.         Assessment:  The primary encounter diagnosis was Acute recurrent maxillary sinusitis. Diagnoses of Acute cough, Wheezing, Essential hypertension, and Chronic allergic rhinitis were also pertinent to this visit.    Plan:  IMPRESSION:  - Diagnosed acute sinusitis with possible asthma exacerbation based on patient's history of chronic rhinitis and asthma  - Opted for doxycycline due to patient's multiple antibiotic allergies  - Decided on steroid therapy to address inflammation  - Recommend asthma inhaler for nighttime wheezing    ACUTE SINUSITIS:  - Patient reports sinus pain and congestion that started 2 weeks ago, improved, and then worsened.  - Pain is noted on  top of eyes, ears, jawline, and head.  - Examination revealed congestion and tenderness of sinuses, particularly around the eyes.  - Patient also experiences mild fever and chills.    PRESCRIBED:  - - Doxycycline 100 mg twice daily for 10 days - Methylprednisolone Dosepak as directed on package      ASTHMA:  - Patient reports wheezing at night.  - Lung exam found lungs sound clear.  - Prescribed albuterol inhaler as needed for wheezing.  - Advised re-evaluating if wheezing persists.        1. Acute recurrent maxillary sinusitis  -     doxycycline (VIBRAMYCIN) 100 MG Cap; Take 1 capsule (100 mg total) by mouth 2 (two) times daily. for 10 days  Dispense: 20 capsule; Refill: 0  -     methylPREDNISolone (MEDROL DOSEPACK) 4 mg tablet; Follow package directions  Dispense: 21 each; Refill: 0  -     albuterol (VENTOLIN HFA) 90 mcg/actuation inhaler; Inhale 2 puffs into the lungs every 6 (six) hours as needed for Wheezing. Rescue  Dispense: 18 g; Refill: 0    2. Acute cough  -     promethazine-dextromethorphan (PROMETHAZINE-DM) 6.25-15 mg/5 mL Syrp; Take 5 mLs by mouth every 8 (eight) hours as needed (cough).  Dispense: 118 mL; Refill: 0    3. Wheezing    4. Essential hypertension    5. Chronic allergic rhinitis        Care plan/goals: reviewed    Follow up: Follow up if symptoms worsen or fail to improve.    This note was generated with the assistance of ambient listening technology. Verbal consent was obtained by the patient and accompanying visitor(s) for the recording of patient appointment to facilitate this note. I attest to having reviewed and edited the generated note for accuracy, though some syntax or spelling errors may persist. Please contact the author of this note for any clarification.

## 2025-02-14 ENCOUNTER — OFFICE VISIT (OUTPATIENT)
Dept: OBSTETRICS AND GYNECOLOGY | Facility: CLINIC | Age: 45
End: 2025-02-14
Payer: COMMERCIAL

## 2025-02-14 ENCOUNTER — PATIENT MESSAGE (OUTPATIENT)
Dept: INTERNAL MEDICINE | Facility: CLINIC | Age: 45
End: 2025-02-14
Payer: COMMERCIAL

## 2025-02-14 VITALS
BODY MASS INDEX: 35.26 KG/M2 | DIASTOLIC BLOOD PRESSURE: 70 MMHG | HEIGHT: 64 IN | WEIGHT: 206.56 LBS | SYSTOLIC BLOOD PRESSURE: 104 MMHG

## 2025-02-14 DIAGNOSIS — Z01.419 ROUTINE GYNECOLOGICAL EXAMINATION: Primary | ICD-10-CM

## 2025-02-14 DIAGNOSIS — N90.89 VULVAR IRRITATION: ICD-10-CM

## 2025-02-14 PROCEDURE — 99999 PR PBB SHADOW E&M-EST. PATIENT-LVL IV: CPT | Mod: PBBFAC,,, | Performed by: NURSE PRACTITIONER

## 2025-02-14 NOTE — PROGRESS NOTES
"  Subjective:       Patient ID: Cindy Alan is a 44 y.o. female.    Chief Complaint:  Annual Exam      History of Present Illness  HPI  No vaginal/pelvic complaints  Mammograms are done at [facility]   Complains of vulva irritation   Health Maintenance   Topic Date Due    Colorectal Cancer Screening  Never done    Mammogram  2024    Influenza Vaccine (1) 2024    COVID-19 Vaccine ( - - season) 10/02/2025 (Originally 2024)    TETANUS VACCINE  2025    Hemoglobin A1c (Diabetic Prevention Screening)  10/03/2027    Cervical Cancer Screening  2028    Lipid Panel  10/03/2029    RSV Vaccine (Age 60+ and Pregnant patients) (1 - 1-dose 75+ series) 2054    Hepatitis C Screening  Completed    HIV Screening  Completed    Pneumococcal Vaccines (Age 0-49)  Aged Out     GYN & OB History  Patient's last menstrual period was 2025 (approximate).   Date of Last Pap: 2023 negative hpv negative     OB History    Para Term  AB Living   12 5   7 5   SAB IAB Ectopic Multiple Live Births       5      # Outcome Date GA Lbr Migue/2nd Weight Sex Type Anes PTL Lv   12 AB            11 AB            10 AB            9 AB            8 AB            7 AB            6 AB            5 Para            4 Para            3 Para            2 Para            1 Para                Review of Systems  Review of Systems        Objective:   /70   Ht 5' 4" (1.626 m)   Wt 93.7 kg (206 lb 9.1 oz)   LMP 2025 (Approximate)   BMI 35.46 kg/m²    Physical Exam:   Constitutional: She is oriented to person, place, and time. She appears well-developed and well-nourished.               Genitourinary:    Pelvic exam was performed with patient in the lithotomy position.                 Neurological: She is alert and oriented to person, place, and time.    Skin: Skin is warm and dry.    Psychiatric: She has a normal mood and affect. Her behavior is normal. Judgment and thought content " normal.        Assessment:        1. Routine gynecological examination    2. Vulvar irritation                Plan:bassem White was seen today for annual exam.    Diagnoses and all orders for this visit:    Routine gynecological examination    Vulvar irritation  -     Ambulatory referral/consult to Dermatology; Future      Attempted to prescribe a cream for vulva irritation, however client is allergic to each cream that I try to prescribe, client states that he is allergic to everything because of her allergies. Recommend see dermatology for vulva irritation treatment.

## 2025-02-18 ENCOUNTER — PATIENT MESSAGE (OUTPATIENT)
Dept: GASTROENTEROLOGY | Facility: CLINIC | Age: 45
End: 2025-02-18
Payer: COMMERCIAL

## 2025-02-18 ENCOUNTER — RESULTS FOLLOW-UP (OUTPATIENT)
Dept: INTERNAL MEDICINE | Facility: CLINIC | Age: 45
End: 2025-02-18

## 2025-02-18 ENCOUNTER — OFFICE VISIT (OUTPATIENT)
Dept: INTERNAL MEDICINE | Facility: CLINIC | Age: 45
End: 2025-02-18
Payer: COMMERCIAL

## 2025-02-18 ENCOUNTER — LAB VISIT (OUTPATIENT)
Dept: LAB | Facility: HOSPITAL | Age: 45
End: 2025-02-18
Payer: COMMERCIAL

## 2025-02-18 VITALS
OXYGEN SATURATION: 97 % | BODY MASS INDEX: 35.64 KG/M2 | DIASTOLIC BLOOD PRESSURE: 74 MMHG | TEMPERATURE: 97 F | WEIGHT: 208.75 LBS | HEIGHT: 64 IN | SYSTOLIC BLOOD PRESSURE: 122 MMHG | HEART RATE: 87 BPM

## 2025-02-18 VITALS
HEIGHT: 64 IN | BODY MASS INDEX: 35.53 KG/M2 | TEMPERATURE: 99 F | WEIGHT: 208.13 LBS | DIASTOLIC BLOOD PRESSURE: 66 MMHG | RESPIRATION RATE: 16 BRPM | HEART RATE: 94 BPM | SYSTOLIC BLOOD PRESSURE: 122 MMHG | OXYGEN SATURATION: 95 %

## 2025-02-18 DIAGNOSIS — R30.0 DYSURIA: Primary | ICD-10-CM

## 2025-02-18 DIAGNOSIS — N89.8 VAGINAL DISCHARGE: ICD-10-CM

## 2025-02-18 DIAGNOSIS — K64.9 HEMORRHOIDS, UNSPECIFIED HEMORRHOID TYPE: ICD-10-CM

## 2025-02-18 DIAGNOSIS — B96.89 BACTERIAL VAGINOSIS: Primary | ICD-10-CM

## 2025-02-18 DIAGNOSIS — R30.0 DYSURIA: ICD-10-CM

## 2025-02-18 DIAGNOSIS — N76.0 BACTERIAL VAGINOSIS: Primary | ICD-10-CM

## 2025-02-18 DIAGNOSIS — N76.0 BACTERIAL VAGINOSIS: ICD-10-CM

## 2025-02-18 DIAGNOSIS — B96.89 BACTERIAL VAGINOSIS: ICD-10-CM

## 2025-02-18 DIAGNOSIS — B37.31 VAGINAL YEAST INFECTION: ICD-10-CM

## 2025-02-18 LAB
BACTERIA GENITAL QL WET PREP: ABNORMAL
CLUE CELLS VAG QL WET PREP: ABNORMAL
FILAMENT FUNGI VAG WET PREP-#/AREA: ABNORMAL
SPECIMEN SOURCE: ABNORMAL
T VAGINALIS GENITAL QL WET PREP: ABNORMAL
WBC #/AREA VAG WET PREP: ABNORMAL
YEAST GENITAL QL WET PREP: ABNORMAL

## 2025-02-18 PROCEDURE — 99214 OFFICE O/P EST MOD 30 MIN: CPT | Mod: S$GLB,,,

## 2025-02-18 PROCEDURE — 3074F SYST BP LT 130 MM HG: CPT | Mod: CPTII,S$GLB,,

## 2025-02-18 PROCEDURE — 87210 SMEAR WET MOUNT SALINE/INK: CPT

## 2025-02-18 PROCEDURE — 87591 N.GONORRHOEAE DNA AMP PROB: CPT

## 2025-02-18 PROCEDURE — 99999 PR PBB SHADOW E&M-EST. PATIENT-LVL V: CPT | Mod: PBBFAC,,,

## 2025-02-18 PROCEDURE — G2211 COMPLEX E/M VISIT ADD ON: HCPCS | Mod: S$GLB,,,

## 2025-02-18 PROCEDURE — 1159F MED LIST DOCD IN RCRD: CPT | Mod: CPTII,S$GLB,,

## 2025-02-18 PROCEDURE — 3078F DIAST BP <80 MM HG: CPT | Mod: CPTII,S$GLB,,

## 2025-02-18 PROCEDURE — 1160F RVW MEDS BY RX/DR IN RCRD: CPT | Mod: CPTII,S$GLB,,

## 2025-02-18 PROCEDURE — 3008F BODY MASS INDEX DOCD: CPT | Mod: CPTII,S$GLB,,

## 2025-02-18 RX ORDER — FLUCONAZOLE 150 MG/1
150 TABLET ORAL ONCE
Qty: 2 TABLET | Refills: 0 | Status: SHIPPED | OUTPATIENT
Start: 2025-02-18 | End: 2025-02-19

## 2025-02-18 RX ORDER — HYDROCORTISONE 25 MG/G
CREAM TOPICAL 2 TIMES DAILY
Qty: 28 G | Refills: 0 | Status: SHIPPED | OUTPATIENT
Start: 2025-02-18

## 2025-02-18 RX ORDER — METRONIDAZOLE 500 MG/1
500 TABLET ORAL EVERY 12 HOURS
Qty: 14 TABLET | Refills: 0 | Status: SHIPPED | OUTPATIENT
Start: 2025-02-18 | End: 2025-02-25

## 2025-02-18 NOTE — PROGRESS NOTES
Cindy Alan  44 y.o. Black or  female  [id number]    Vilma Phillip DO  Patient Care Team:  Vilma Phillip DO as PCP - General (Internal Medicine)    Chief Complaint:   Chief Complaint   Patient presents with    Vaginitis     Pt states she think antibiotic gave her yeast affection, itching and burning  ,     Hemorrhoids       History of Present Illness:  Pt is here for vaginal itching and hemorrhoids and is known to me.    She was treated for a sinus infection ***.    Is able to tolerate steroid cream  Allergic to neomycin component.      History of Present Illness             HPI     The following were reviewed: active problem list, medication list, allergies, family history, social history, and Health Maintenance.     History:  Past Medical History:   Diagnosis Date    Asthma     Essential (primary) hypertension      Past Surgical History:   Procedure Laterality Date    DILATION AND CURETTAGE OF UTERUS       Family History   Problem Relation Name Age of Onset    Kidney disease Mother janusz     Diabetes Mother janusz     Hypertension Mother janusz     Thyroiditis Mother janusz     Cataracts Mother janusz     Birth defects Mother janusz     Heart disease Mother janusz     Vision loss Mother janusz     Hypertension Maternal Grandfather Pan     Asthma Maternal Grandmother miya     Heart disease Maternal Grandmother miya     Hypertension Maternal Grandmother miya     Miscarriages / Stillbirths Maternal Grandmother miya     Diabetes Maternal Aunt Elenora     Heart disease Maternal Aunt Elenora     Hypertension Maternal Aunt Elenora     Kidney disease Maternal Aunt Elenora     Vision loss Maternal Aunt Elenora     Heart disease Maternal Uncle Vance     Hypertension Maternal Uncle Vance      Social History[1]  Problem List[2]  Review of patient's allergies indicates:   Allergen Reactions    Neomycin Swelling    Penicillin Swelling    Cefdinir      Other Reaction(s): ABDOMEN PAIN    Reports abdominal  pain    Adhesive tape-silicones     Neomycin-polymyxin b-dexameth      Eye cream    Amoxicillin Rash    Azithromycin Rash    Latex, natural rubber Rash       Health Maintenance  Health Maintenance Due   Topic Date Due    Colorectal Cancer Screening  Never done    Mammogram  08/21/2024    Influenza Vaccine (1) 09/01/2024       Medications:  Medications Ordered Prior to Encounter[3]    Medications have been reviewed and reconciled with patient at visit today.    Laboratory Reviewed: ({YES NO:63602})  Lab Results   Component Value Date    WBC 8.68 01/28/2025    HGB 13.1 01/28/2025    HCT 39.6 01/28/2025     01/28/2025    CHOL 211 (H) 10/03/2024    TRIG 73 10/03/2024    HDL 48 10/03/2024    ALT 17 01/28/2025    AST 16 01/28/2025     01/28/2025    K 3.7 01/28/2025     01/28/2025    CREATININE 1.1 01/28/2025    BUN 15 01/28/2025    CO2 19 (L) 01/28/2025    TSH 1.092 10/03/2024    HGBA1C 5.3 10/03/2024       ROS:  ROS    Exam:  Vitals:    02/18/25 1441   BP: 122/74   Pulse: 87   Temp: 97.4 °F (36.3 °C)     Weight: 94.7 kg (208 lb 12.4 oz)   Body mass index is 35.84 kg/m².    Physical Exam  Physical Exam               Assessment:  The primary encounter diagnosis was Dysuria. Diagnoses of Vaginal discharge, Vaginal yeast infection, and Hemorrhoids, unspecified hemorrhoid type were also pertinent to this visit.    Plan:  Assessment & Plan            1. Dysuria  -     Urinalysis, Reflex to Urine Culture Urine, Clean Catch; Future; Expected date: 02/18/2025  -     C. trachomatis/N. gonorrhoeae by AMP DNA [facility]; Urine; Future; Expected date: 02/18/2025    2. Vaginal discharge  -     Vaginal Screen    3. Vaginal yeast infection  -     fluconazole (DIFLUCAN) 150 MG Tab; Take 1 tablet (150 mg total) by mouth once. Take 1 tablet by mouth once. Repeat dose in 72 hours if symptoms have not resolved. for 1 dose  Dispense: 2 tablet; Refill: 0    4. Hemorrhoids, unspecified hemorrhoid type  -     hydrocortisone  (ANUSOL-HC) 2.5 % rectal cream; Place rectally 2 (two) times daily.  Dispense: 28 g; Refill: 0        Care plan/goals: reviewed    Follow up: No follow-ups on file.    This note was generated with the assistance of ambient listening technology. Verbal consent was obtained by the patient and accompanying visitor(s) for the recording of patient appointment to facilitate this note. I attest to having reviewed and edited the generated note for accuracy, though some syntax or spelling errors may persist. Please contact the author of this note for any clarification.         [1]   Social History  Socioeconomic History    Marital status: Other   Tobacco Use    Smoking status: Never     Passive exposure: Never    Smokeless tobacco: Never   Substance and Sexual Activity    Alcohol use: Not Currently    Drug use: Never    Sexual activity: Yes     Partners: Male     Birth control/protection: None     Social Drivers of Health     Financial Resource Strain: Low Risk  (2/18/2025)    Overall Financial Resource Strain (CARDIA)     Difficulty of Paying Living Expenses: Not hard at all   Food Insecurity: No Food Insecurity (2/18/2025)    Hunger Vital Sign     Worried About Running Out of Food in the Last Year: Never true     Ran Out of Food in the Last Year: Never true   Transportation Needs: No Transportation Needs (2/18/2025)    PRAPARE - Transportation     Lack of Transportation (Medical): No     Lack of Transportation (Non-Medical): No   Physical Activity: Insufficiently Active (2/18/2025)    Exercise Vital Sign     Days of Exercise per Week: 1 day     Minutes of Exercise per Session: 30 min   Stress: No Stress Concern Present (2/18/2025)    [employer] [place] [employer] - Occupational Stress Questionnaire     Feeling of Stress : Not at all   Housing Stability: Low Risk  (2/18/2025)    Housing Stability Vital Sign     Unable to Pay for Housing in the Last Year: No     Homeless in the Last Year: No   [2]   Patient Active  Problem List  Diagnosis    Essential hypertension    Severe obesity (BMI 35.0-39.9) with comorbidity    Weakness of trunk musculature   [3]   Current Outpatient Medications on File Prior to Visit   Medication Sig Dispense Refill    albuterol (VENTOLIN HFA) 90 mcg/actuation inhaler Inhale 2 puffs into the lungs every 6 (six) hours as needed for Wheezing. Rescue 18 g 0    cetirizine (ZYRTEC) 10 MG tablet Take 1 tablet by mouth at bedtime. Before bath 30 tablet 5    dicyclomine (BENTYL) 20 mg tablet Take 1 tablet (20 mg total) by mouth 2 (two) times daily. 20 tablet 0    fexofenadine (ALLEGRA) 180 MG tablet Take 180 mg by mouth once daily.      fluticasone propionate (FLONASE) 50 mcg/actuation nasal spray 2 sprays (100 mcg total) by Each Nostril route once daily. 48 mL 3    hydroCHLOROthiazide (HYDRODIURIL) 12.5 MG Tab Take 1 tablet (12.5 mg total) by mouth once daily. 30 tablet 11    ondansetron (ZOFRAN) 4 MG tablet Take 1 tablet (4 mg total) by mouth every 6 (six) hours as needed. 12 tablet 0    promethazine-dextromethorphan (PROMETHAZINE-DM) 6.25-15 mg/5 mL Syrp Take 5 mLs by mouth every 8 (eight) hours as needed (cough). 118 mL 0    azelastine (ASTELIN) 137 mcg (0.1 %) nasal spray 1 spray (137 mcg total) by Nasal route 2 (two) times daily. 30 mL 0    doxycycline (VIBRAMYCIN) 100 MG Cap Take 1 capsule (100 mg total) by mouth 2 (two) times daily. for 10 days (Patient not taking: Reported on 2/18/2025) 20 capsule 0    methylPREDNISolone (MEDROL DOSEPACK) 4 mg tablet Follow package directions (Patient not taking: Reported on 2/18/2025) 21 each 0     No current facility-administered medications on file prior to visit.      Urine Culture Urine, Clean Catch; Future; Expected date: 02/18/2025  -     C. trachomatis/N. gonorrhoeae by AMP [facility]; Urine; Future; Expected date: 02/18/2025    5. Vaginal discharge  -     Vaginal Screen        Care plan/goals: reviewed    Follow up: Follow up if symptoms worsen or fail to improve.         [1]   Social History  Socioeconomic History    Marital status: Other   Tobacco Use    Smoking status: Never     Passive exposure: Never    Smokeless tobacco: Never   Substance and Sexual Activity    Alcohol use: Not Currently    Drug use: Never    Sexual activity: Yes     Partners: Male     Birth control/protection: None     Social Drivers of Health     Financial Resource Strain: Low Risk  (2/18/2025)    Overall Financial Resource Strain (CARDIA)     Difficulty of Paying Living Expenses: Not hard at all   Food Insecurity: No Food Insecurity (2/18/2025)    Hunger Vital Sign     Worried About Running Out of Food in the Last Year: Never true     Ran Out of Food in the Last Year: Never true   Transportation Needs: No Transportation Needs (2/18/2025)    PRAPARE - Transportation     Lack of Transportation (Medical): No     Lack of Transportation (Non-Medical): No   Physical Activity: Insufficiently Active (2/18/2025)    Exercise Vital Sign     Days of Exercise per Week: 1 day     Minutes of Exercise per Session: 30 min   Stress: No Stress Concern Present (2/18/2025)    [employer] [place] [employer] - Occupational Stress Questionnaire     Feeling of Stress : Not at all   Housing Stability: Low Risk  (2/18/2025)    Housing Stability Vital Sign     Unable to Pay for Housing in the Last Year: No     Homeless in the Last Year: No   [2]   Patient Active Problem List  Diagnosis    Essential hypertension    Severe obesity (BMI 35.0-39.9) with comorbidity    Weakness of trunk musculature   [3]   Current Outpatient Medications on File Prior to Visit   Medication Sig Dispense Refill    albuterol (VENTOLIN HFA)  90 mcg/actuation inhaler Inhale 2 puffs into the lungs every 6 (six) hours as needed for Wheezing. Rescue 18 g 0    cetirizine (ZYRTEC) 10 MG tablet Take 1 tablet by mouth at bedtime. Before bath 30 tablet 5    dicyclomine (BENTYL) 20 mg tablet Take 1 tablet (20 mg total) by mouth 2 (two) times daily. 20 tablet 0    fexofenadine (ALLEGRA) 180 MG tablet Take 180 mg by mouth once daily.      fluticasone propionate (FLONASE) 50 mcg/actuation nasal spray 2 sprays (100 mcg total) by Each Nostril route once daily. 48 mL 3    hydroCHLOROthiazide (HYDRODIURIL) 12.5 MG Tab Take 1 tablet (12.5 mg total) by mouth once daily. 30 tablet 11    ondansetron (ZOFRAN) 4 MG tablet Take 1 tablet (4 mg total) by mouth every 6 (six) hours as needed. 12 tablet 0    azelastine (ASTELIN) 137 mcg (0.1 %) nasal spray 1 spray (137 mcg total) by Nasal route 2 (two) times daily. 30 mL 0    methylPREDNISolone (MEDROL DOSEPACK) 4 mg tablet Follow package directions (Patient not taking: Reported on 2/18/2025) 21 each 0     No current facility-administered medications on file prior to visit.

## 2025-02-22 LAB
C TRACH DNA SPEC QL NAA+PROBE: NOT DETECTED
N GONORRHOEA DNA SPEC QL NAA+PROBE: NOT DETECTED

## 2025-02-24 VITALS
TEMPERATURE: 97 F | HEART RATE: 87 BPM | DIASTOLIC BLOOD PRESSURE: 74 MMHG | SYSTOLIC BLOOD PRESSURE: 122 MMHG | OXYGEN SATURATION: 97 % | WEIGHT: 208.75 LBS | BODY MASS INDEX: 35.64 KG/M2 | RESPIRATION RATE: 16 BRPM | HEIGHT: 64 IN

## 2025-04-10 ENCOUNTER — RESULTS FOLLOW-UP (OUTPATIENT)
Dept: OTHER | Facility: CLINIC | Age: 45
End: 2025-04-10

## 2025-04-30 ENCOUNTER — PATIENT OUTREACH (OUTPATIENT)
Dept: ADMINISTRATIVE | Facility: HOSPITAL | Age: 45
End: 2025-04-30
Payer: COMMERCIAL

## 2025-05-09 ENCOUNTER — PATIENT MESSAGE (OUTPATIENT)
Dept: INTERNAL MEDICINE | Facility: CLINIC | Age: 45
End: 2025-05-09
Payer: COMMERCIAL

## 2025-07-01 ENCOUNTER — OFFICE VISIT (OUTPATIENT)
Dept: DERMATOLOGY | Facility: CLINIC | Age: 45
End: 2025-07-01
Payer: COMMERCIAL

## 2025-07-01 DIAGNOSIS — L70.0 ACNE VULGARIS: Primary | ICD-10-CM

## 2025-07-01 DIAGNOSIS — L82.1 DERMATOSIS PAPULOSA NIGRA: ICD-10-CM

## 2025-07-01 PROCEDURE — 99999 PR PBB SHADOW E&M-EST. PATIENT-LVL III: CPT | Mod: PBBFAC,,, | Performed by: STUDENT IN AN ORGANIZED HEALTH CARE EDUCATION/TRAINING PROGRAM

## 2025-07-01 NOTE — PROGRESS NOTES
Patient Information  Name: Cindy Alan  : 1980  MRN: [id number]     Referring Physician:  Dr. Martinez   Primary Care Physician:  Vilma Ireland DO   Date of Visit: 2025      Subjective:       Cindy Alan is a 45 y.o. female who presents for   Chief Complaint   Patient presents with    Skin Check     FBSE, pt states he has lots of moles      HPI  Patient here for skin check.     Does patient have a personal hx of skin cancers? no  Does patient have family hx of melanoma?  no  Does patient have hx of strong sun exposure or tanning bed use in the past? no    Patient was last seen:Visit date not found     Prior notes by myself reviewed.   Clinical documentation obtained by nursing staff reviewed.    Review of Systems   Skin:  Negative for itching and rash.        Objective:    Physical Exam   Constitutional: She appears well-developed and well-nourished. No distress.   Neurological: She is alert and oriented to person, place, and time. She is not disoriented.   Psychiatric: She has a normal mood and affect.   Skin:   Areas Examined (abnormalities noted in diagram):   Scalp / Hair Palpated and Inspected  Head / Face Inspection Performed  Neck Inspection Performed  Chest / Axilla Inspection Performed  Abdomen Inspection Performed  Genitals / Buttocks / Groin Inspection Performed  Back Inspection Performed  RUE Inspected  LUE Inspection Performed  RLE Inspected  LLE Inspection Performed  Nails and Digits Inspection Performed                   Diagram Legend     Erythematous scaling macule/papule c/w actinic keratosis       Vascular papule c/w angioma      Pigmented verrucoid papule/plaque c/w seborrheic keratosis      Yellow umbilicated papule c/w sebaceous hyperplasia      Irregularly shaped tan macule c/w lentigo     1-2 mm smooth white papules consistent with Milia      Movable subcutaneous cyst with punctum c/w epidermal inclusion cyst      Subcutaneous movable cyst c/w pilar cyst       Firm pink to brown papule c/w dermatofibroma      Pedunculated fleshy papule(s) c/w skin tag(s)      Evenly pigmented macule c/w junctional nevus     Mildly variegated pigmented, slightly irregular-bordered macule c/w mildly atypical nevus      Flesh colored to evenly pigmented papule c/w intradermal nevus       Pink pearly papule/plaque c/w basal cell carcinoma      Erythematous hyperkeratotic cursted plaque c/w SCC      Surgical scar with no sign of skin cancer recurrence      Open and closed comedones      Inflammatory papules and pustules      Verrucoid papule consistent consistent with wart     Erythematous eczematous patches and plaques     Dystrophic onycholytic nail with subungual debris c/w onychomycosis     Umbilicated papule    Erythematous-base heme-crusted tan verrucoid plaque consistent with inflamed seborrheic keratosis     Erythematous Silvery Scaling Plaque c/w Psoriasis     See annotation      No images are attached to the encounter or orders placed in the encounter.    [] Data reviewed  [] Independent review of test  [] Management discussed with another provider    Assessment / Plan:        Acne vulgaris  Recommend benzoyl peroxide cleanser    Dermatosis papulosa nigra  -    Reassurance given to patient. No treatment is necessary.   Treatment of benign, asymptomatic lesions may be considered cosmetic.  Patient to message if she is interested in having these removed             LOS NUMBER AND COMPLEXITY OF PROBLEMS    COMPLEXITY OF DATA RISK TOTAL TIME (m)   60018  12781 [] 1 self-limited or minor problem [x] Minimal to none [] No treatment recommended or patient to monitor 15-29  10-19   28986  63135 Low  [] 2 or > self limited or minor problems  [] 1 stable chronic illness  [] 1 acute, uncomplicated illness or injury Limited (2)  [] Prior external notes from each unique source  [] Review result of each unique test  [] Order each unique test [x]  Low  OTC medications, minor skin biopsy  30-44  20-29   53642  15261 Moderate  [x]  1 or > chronic illness with progression, exacerbation or SE of treatment  []  2 or more stable chronic illnesses  []  1 acute illness with systemic symptoms  []  1 acute complicated injury  []  1 undiagnosed new problem with uncertain prognosis Moderate (1/3 below)  []  3 or more data items        *Now includes assessment requiring independent historian  []  Independent interpretation of a test  []  Discuss management/test with another provider Moderate  []  Prescription drug mgmt  []  Minor surgery with risk discussed  []  Mgmt limited by social determinates 45-59  30-39   70387  31705 High  []  1 or more chronic illness with severe exacerbation, progression or SE of treatment  []  1 acute or chronic illness/injury that poses a threat to life or bodily function Extensive (2/3 below)  []  3 or more data items        *Now includes assessment requiring independent historian.  []  Independent interpretation of a test  []  Discuss management/test with another provider High  []  Major surgery with risk discussed  []  Drug therapy requiring intensive monitoring for toxicity  []  Hospitalization  []  Decision for DNR 60-74  40-54      No follow-ups on file.    Jenna Castaneda MD, FAAD  [facility]

## 2025-07-09 ENCOUNTER — OFFICE VISIT (OUTPATIENT)
Dept: INTERNAL MEDICINE | Facility: CLINIC | Age: 45
End: 2025-07-09
Payer: COMMERCIAL

## 2025-07-09 ENCOUNTER — LAB VISIT (OUTPATIENT)
Dept: LAB | Facility: HOSPITAL | Age: 45
End: 2025-07-09
Payer: COMMERCIAL

## 2025-07-09 VITALS
OXYGEN SATURATION: 99 % | TEMPERATURE: 98 F | WEIGHT: 212.31 LBS | DIASTOLIC BLOOD PRESSURE: 82 MMHG | RESPIRATION RATE: 16 BRPM | BODY MASS INDEX: 36.25 KG/M2 | HEIGHT: 64 IN | HEART RATE: 91 BPM | SYSTOLIC BLOOD PRESSURE: 122 MMHG

## 2025-07-09 DIAGNOSIS — M25.474 BILATERAL SWELLING OF FEET AND ANKLES: Primary | ICD-10-CM

## 2025-07-09 DIAGNOSIS — M25.475 BILATERAL SWELLING OF FEET AND ANKLES: ICD-10-CM

## 2025-07-09 DIAGNOSIS — M25.474 BILATERAL SWELLING OF FEET AND ANKLES: ICD-10-CM

## 2025-07-09 DIAGNOSIS — M25.471 BILATERAL SWELLING OF FEET AND ANKLES: Primary | ICD-10-CM

## 2025-07-09 DIAGNOSIS — M25.471 BILATERAL SWELLING OF FEET AND ANKLES: ICD-10-CM

## 2025-07-09 DIAGNOSIS — M25.475 BILATERAL SWELLING OF FEET AND ANKLES: Primary | ICD-10-CM

## 2025-07-09 DIAGNOSIS — M25.472 BILATERAL SWELLING OF FEET AND ANKLES: Primary | ICD-10-CM

## 2025-07-09 DIAGNOSIS — M25.472 BILATERAL SWELLING OF FEET AND ANKLES: ICD-10-CM

## 2025-07-09 LAB
ABSOLUTE EOSINOPHIL (OHS): 0.16 K/UL
ABSOLUTE MONOCYTE (OHS): 0.81 K/UL (ref 0.3–1)
ABSOLUTE NEUTROPHIL COUNT (OHS): 6.21 K/UL (ref 1.8–7.7)
ALBUMIN SERPL BCP-MCNC: 4.1 G/DL (ref 3.5–5.2)
ALP SERPL-CCNC: 78 UNIT/L (ref 40–150)
ALT SERPL W/O P-5'-P-CCNC: 14 UNIT/L (ref 10–44)
ANION GAP (OHS): 9 MMOL/L (ref 8–16)
AST SERPL-CCNC: 13 UNIT/L (ref 11–45)
BASOPHILS # BLD AUTO: 0.03 K/UL
BASOPHILS NFR BLD AUTO: 0.3 %
BILIRUB SERPL-MCNC: 0.3 MG/DL (ref 0.1–1)
BNP SERPL-MCNC: <10 PG/ML (ref 0–99)
BUN SERPL-MCNC: 10 MG/DL (ref 6–20)
CALCIUM SERPL-MCNC: 9.4 MG/DL (ref 8.7–10.5)
CHLORIDE SERPL-SCNC: 104 MMOL/L (ref 95–110)
CO2 SERPL-SCNC: 26 MMOL/L (ref 23–29)
CREAT SERPL-MCNC: 1 MG/DL (ref 0.5–1.4)
ERYTHROCYTE [DISTWIDTH] IN BLOOD BY AUTOMATED COUNT: 12.7 % (ref 11.5–14.5)
GFR SERPLBLD CREATININE-BSD FMLA CKD-EPI: >60 ML/MIN/1.73/M2
GLUCOSE SERPL-MCNC: 98 MG/DL (ref 70–110)
HCT VFR BLD AUTO: 39 % (ref 37–48.5)
HGB BLD-MCNC: 12.5 GM/DL (ref 12–16)
IMM GRANULOCYTES # BLD AUTO: 0.03 K/UL (ref 0–0.04)
IMM GRANULOCYTES NFR BLD AUTO: 0.3 % (ref 0–0.5)
LYMPHOCYTES # BLD AUTO: 3.61 K/UL (ref 1–4.8)
MCH RBC QN AUTO: 28.7 PG (ref 27–31)
MCHC RBC AUTO-ENTMCNC: 32.1 G/DL (ref 32–36)
MCV RBC AUTO: 90 FL (ref 82–98)
NUCLEATED RBC (/100WBC) (OHS): 0 /100 WBC
PLATELET # BLD AUTO: 247 K/UL (ref 150–450)
PMV BLD AUTO: 12 FL (ref 9.2–12.9)
POTASSIUM SERPL-SCNC: 3.7 MMOL/L (ref 3.5–5.1)
PROT SERPL-MCNC: 7.3 GM/DL (ref 6–8.4)
RBC # BLD AUTO: 4.35 M/UL (ref 4–5.4)
RELATIVE EOSINOPHIL (OHS): 1.5 %
RELATIVE LYMPHOCYTE (OHS): 33.3 % (ref 18–48)
RELATIVE MONOCYTE (OHS): 7.5 % (ref 4–15)
RELATIVE NEUTROPHIL (OHS): 57.1 % (ref 38–73)
SODIUM SERPL-SCNC: 139 MMOL/L (ref 136–145)
TSH SERPL-ACNC: 0.96 UIU/ML (ref 0.4–4)
VIT B12 SERPL-MCNC: 500 PG/ML (ref 210–950)
WBC # BLD AUTO: 10.85 K/UL (ref 3.9–12.7)

## 2025-07-09 PROCEDURE — 3074F SYST BP LT 130 MM HG: CPT | Mod: CPTII,S$GLB,,

## 2025-07-09 PROCEDURE — 36415 COLL VENOUS BLD VENIPUNCTURE: CPT

## 2025-07-09 PROCEDURE — 1160F RVW MEDS BY RX/DR IN RCRD: CPT | Mod: CPTII,S$GLB,,

## 2025-07-09 PROCEDURE — 85025 COMPLETE CBC W/AUTO DIFF WBC: CPT

## 2025-07-09 PROCEDURE — 99999 PR PBB SHADOW E&M-EST. PATIENT-LVL IV: CPT | Mod: PBBFAC,,,

## 2025-07-09 PROCEDURE — 84443 ASSAY THYROID STIM HORMONE: CPT

## 2025-07-09 PROCEDURE — 84075 ASSAY ALKALINE PHOSPHATASE: CPT

## 2025-07-09 PROCEDURE — 1159F MED LIST DOCD IN RCRD: CPT | Mod: CPTII,S$GLB,,

## 2025-07-09 PROCEDURE — 83880 ASSAY OF NATRIURETIC PEPTIDE: CPT

## 2025-07-09 PROCEDURE — 3079F DIAST BP 80-89 MM HG: CPT | Mod: CPTII,S$GLB,,

## 2025-07-09 PROCEDURE — 3008F BODY MASS INDEX DOCD: CPT | Mod: CPTII,S$GLB,,

## 2025-07-09 PROCEDURE — 99214 OFFICE O/P EST MOD 30 MIN: CPT | Mod: S$GLB,,,

## 2025-07-09 PROCEDURE — 82607 VITAMIN B-12: CPT

## 2025-07-09 NOTE — PROGRESS NOTES
"Cindy Alan  07/10/2025  [id number]    Vilma Phillip DO  Patient Care Team:  Vilma Phillip DO as PCP - General (Internal Medicine)          Visit Type:an urgent visit for a new problem    Chief Complaint:  Chief Complaint   Patient presents with    Leg Swelling     Pt is c/o bilateral ankle swelling x 2 months       History of Present Illness:    History of Present Illness    CHIEF COMPLAINT:  Patient presents today for bilateral ankle swelling.    LOWER EXTREMITY SYMPTOMS:  She reports bilateral ankle swelling progressively worsening since April, currently at its worst. Right leg is more severely affected with midleg pain and "pricks" on the top of the ankle. The swelling is painful and accompanied by skin discoloration appearing darker in the affected areas. These symptoms have been consistent through May and June. She experiences increased leg pain and discomfort with prolonged sitting, noting pain ascending her leg during work hours.    CARDIOVASCULAR:  She reports chest heaviness over the past 1-2 weeks, which she initially attributed to anxiety. She describes it as a heavy feeling in her chest. She denies current chest pain or shortness of breath.    FAMILY HISTORY:  She has significant family history of cardiovascular issues. Her mother had multiple stents placed. Her maternal grandmother, grandfather, and aunt have history of blood clots and cardiovascular interventions.    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 She attempts to mitigate this by elevating legs using a box under her desk. She recently completed a 4-hour flight with a layover.    MEDICATIONS:  She was previously prescribed hydrochlorothiazide for blood pressure management but discontinued due to significant blood pressure drops every three days. She is currently not taking any blood pressure medication.      ROS:  General: -fever, -chills, -fatigue, -weight gain, " -weight loss  Eyes: -vision changes, -redness, -discharge  ENT: -ear pain, -nasal congestion, -sore throat  Cardiovascular: -chest pain, -palpitations, +lower extremity edema, +chest pressure, +chest tightness  Respiratory: -cough, -shortness of breath  Gastrointestinal: -abdominal pain, -nausea, -vomiting, -diarrhea, -constipation, -blood in stool  Genitourinary: -dysuria, -hematuria, -frequency  Musculoskeletal: -joint pain, -muscle pain, +limb pain  Skin: -rash, -lesion  Neurological: -headache, -dizziness, -numbness, -tingling  Psychiatric: +anxiety, -depression, -sleep difficulty            The following were reviewed at this visit: active problem list, medication list, allergies, family history, social history, and health maintenance.  History:  Past Medical History:   Diagnosis Date    Asthma     Essential (primary) hypertension      Past Surgical History:   Procedure Laterality Date    DILATION AND CURETTAGE OF UTERUS       Problem List[1]    Medications:  Medications Ordered Prior to Encounter[2]    Medications have been reviewed and reconciled with patient at this visit.    Exam:  Wt Readings from Last 3 Encounters:   07/09/25 96.3 kg (212 lb 4.9 oz)   04/01/25 92.1 kg (203 lb)   02/18/25 94.7 kg (208 lb 12.4 oz)     Temp Readings from Last 3 Encounters:   07/09/25 97.7 °F (36.5 °C) (Tympanic)   02/18/25 97.4 °F (36.3 °C) (Tympanic)   02/11/25 98.8 °F (37.1 °C) (Tympanic)     BP Readings from Last 3 Encounters:   07/09/25 122/82   04/01/25 116/74   02/18/25 122/74     Pulse Readings from Last 3 Encounters:   07/09/25 91   02/18/25 87   02/11/25 94     Body mass index is 36.44 kg/m².      Physical Exam  Physical Exam             Laboratory Reviewed ({Yes)    Lab Results   Component Value Date    WBC 10.85 07/09/2025    HGB 12.5 07/09/2025    HCT 39.0 07/09/2025     07/09/2025    CHOL 211 (H) 10/03/2024    TRIG 73 10/03/2024    HDL 48 10/03/2024    ALT 14 07/09/2025    AST 13 07/09/2025      07/09/2025    K 3.7 07/09/2025     07/09/2025    CREATININE 1.0 07/09/2025    BUN 10 07/09/2025    CO2 26 07/09/2025    TSH 0.963 07/09/2025    HGBA1C 5.3 10/03/2024     Cindy was seen today for leg swelling.    Diagnoses and all orders for this visit:    Bilateral swelling of feet and ankles  -     CBC Auto Differential; Future  -     Comprehensive Metabolic Panel; Future  -     BNP; Future  -     Vitamin B12; Future  -     TSH; Future  -     CV Ultrasound doppler venous DVT leg left; Future    Assessment & Plan    IMPRESSION:  - Suspect dependent edema as cause of bilateral ankle swelling.  - Considered possibility of congestive heart failure or DVT given family history and recent air travel.  - Evaluated for potential cardiovascular issues due to reported chest heaviness.    VENOUS INSUFFICIENCY AND EDEMA:  - Patient reports bilateral ankle swelling since April, worsening after long flights and prolonged sitting.  - Evaluated the patient who experiences pain and pricks in the legs, with significant swelling observed.  - Suspect dependent edema and ordered lower extremities ultrasound to evaluate venous insufficiency, edema, skin changes, and to rule out DVT or blood clots due to prolonged sitting during flights.    SKIN CHANGES:  - Patient reports skin discoloration and darkness on legs, similar to mother's condition.  - Evaluated skin tone changes which align with family history.    CHEST PAIN:  - Patient reports heaviness in chest, possibly anxiety-related, noticed within the past 2 weeks.  - Plan to rule out congestive heart failure and other cardiac conditions.    FAMILY HISTORY OF CIRCULATORY SYSTEM DISEASES:  - Noted the patient has significant family history of blood clots and stents in mother, aunt, grandfather, and grandmother.  - Acknowledge family history as risk factor for potential circulatory issues.    PERSONAL HISTORY OF VENOUS THROMBOSIS:  - Plan to rule out recurrent DVT or blood clots  due to prolonged sitting during flights.    LACK OF PHYSICAL EXERCISE:  - Patient reports sitting all day at work, contributing to swelling.  - Ordered ultrasound to evaluate vascular consequences of prolonged sitting.    LABS AND FOLLOW-UP:  - Ordered basic labs including CBC, CMP, BNP to evaluate cardiac function, as well as vitamin B12 and TSH.  - Will contact the patient via patient portal with both ultrasound and lab results.         Care Plan/Goals: Reviewed     Follow up: No follow-ups on file.    After visit summary was printed and given to patient upon discharge today.  Patient goals and care plan are included in After Visit Summary.      This note was generated with the assistance of ambient listening technology. Verbal consent was obtained by the patient and accompanying visitor(s) for the recording of patient appointment to facilitate this note. I attest to having reviewed and edited the generated note for accuracy, though some syntax or spelling errors may persist. Please contact the author of this note for any clarification.              [1]   Patient Active Problem List  Diagnosis    Essential hypertension    Severe obesity (BMI 35.0-39.9) with comorbidity    Weakness of trunk musculature   [2]   Current Outpatient Medications on File Prior to Visit   Medication Sig Dispense Refill    albuterol (VENTOLIN HFA) 90 mcg/actuation inhaler Inhale 2 puffs into the lungs every 6 (six) hours as needed for Wheezing. Rescue 18 g 0    azelastine (ASTELIN) 137 mcg (0.1 %) nasal spray 1 spray (137 mcg total) by Nasal route 2 (two) times daily. 30 mL 0    cetirizine (ZYRTEC) 10 MG tablet Take 1 tablet by mouth at bedtime. Before bath 30 tablet 5    fexofenadine (ALLEGRA) 180 MG tablet Take 180 mg by mouth once daily.      fluticasone propionate (FLONASE) 50 mcg/actuation nasal spray 2 sprays (100 mcg total) by Each Nostril route once daily. 48 mL 3    fluconazole (DIFLUCAN) 150 MG Tab Take 1 tablet by mouth 1 time  for 1 dose. Repeat in 7 days if symptoms persist. (Patient not taking: Reported on 7/9/2025) 2 tablet 0    hydroCHLOROthiazide (HYDRODIURIL) 12.5 MG Tab Take 1 tablet (12.5 mg total) by mouth once daily. (Patient not taking: Reported on 7/9/2025) 30 tablet 11     No current facility-administered medications on file prior to visit.

## 2025-07-10 ENCOUNTER — HOSPITAL ENCOUNTER (OUTPATIENT)
Dept: RADIOLOGY | Facility: HOSPITAL | Age: 45
Discharge: HOME | End: 2025-07-10
Payer: COMMERCIAL

## 2025-07-10 DIAGNOSIS — M25.471 BILATERAL SWELLING OF FEET AND ANKLES: ICD-10-CM

## 2025-07-10 DIAGNOSIS — M25.474 BILATERAL SWELLING OF FEET AND ANKLES: ICD-10-CM

## 2025-07-10 DIAGNOSIS — M25.472 BILATERAL SWELLING OF FEET AND ANKLES: ICD-10-CM

## 2025-07-10 DIAGNOSIS — M25.475 BILATERAL SWELLING OF FEET AND ANKLES: ICD-10-CM

## 2025-07-10 PROCEDURE — 93970 EXTREMITY STUDY: CPT | Mod: 26,,, | Performed by: RADIOLOGY

## 2025-07-10 PROCEDURE — 93970 EXTREMITY STUDY: CPT | Mod: TC

## 2025-08-13 ENCOUNTER — PATIENT MESSAGE (OUTPATIENT)
Dept: ADMINISTRATIVE | Facility: HOSPITAL | Age: 45
End: 2025-08-13
Payer: COMMERCIAL

## 2025-08-26 ENCOUNTER — OFFICE VISIT (OUTPATIENT)
Dept: URGENT CARE | Facility: CLINIC | Age: 45
End: 2025-08-26
Payer: COMMERCIAL

## 2025-08-26 VITALS
WEIGHT: 207.25 LBS | DIASTOLIC BLOOD PRESSURE: 86 MMHG | SYSTOLIC BLOOD PRESSURE: 137 MMHG | BODY MASS INDEX: 35.38 KG/M2 | TEMPERATURE: 99 F | HEART RATE: 89 BPM | OXYGEN SATURATION: 98 % | RESPIRATION RATE: 18 BRPM | HEIGHT: 64 IN

## 2025-08-26 DIAGNOSIS — U07.1 COVID-19 VIRUS INFECTION: Primary | ICD-10-CM

## 2025-08-26 DIAGNOSIS — U07.1 COVID-19 VIRUS DETECTED: ICD-10-CM

## 2025-08-26 DIAGNOSIS — R05.9 COUGH, UNSPECIFIED TYPE: ICD-10-CM

## 2025-08-26 LAB
CTP QC/QA: YES
SARS-COV+SARS-COV-2 AG RESP QL IA.RAPID: POSITIVE

## 2025-08-26 PROCEDURE — 99213 OFFICE O/P EST LOW 20 MIN: CPT | Mod: S$GLB,,, | Performed by: PHYSICIAN ASSISTANT

## 2025-08-26 PROCEDURE — 87811 SARS-COV-2 COVID19 W/OPTIC: CPT | Mod: QW,S$GLB,, | Performed by: PHYSICIAN ASSISTANT

## 2025-08-28 ENCOUNTER — TELEPHONE (OUTPATIENT)
Dept: URGENT CARE | Facility: CLINIC | Age: 45
End: 2025-08-28
Payer: COMMERCIAL

## 2025-08-29 ENCOUNTER — TELEPHONE (OUTPATIENT)
Dept: URGENT CARE | Facility: CLINIC | Age: 45
End: 2025-08-29
Payer: COMMERCIAL

## 2025-09-02 ENCOUNTER — OFFICE VISIT (OUTPATIENT)
Dept: INTERNAL MEDICINE | Facility: CLINIC | Age: 45
End: 2025-09-02
Payer: COMMERCIAL

## 2025-09-02 VITALS
SYSTOLIC BLOOD PRESSURE: 122 MMHG | DIASTOLIC BLOOD PRESSURE: 86 MMHG | RESPIRATION RATE: 18 BRPM | WEIGHT: 206.38 LBS | HEART RATE: 84 BPM | HEIGHT: 64 IN | BODY MASS INDEX: 35.23 KG/M2 | OXYGEN SATURATION: 98 % | TEMPERATURE: 97 F

## 2025-09-02 DIAGNOSIS — U09.9 POST COVID-19 CONDITION, UNSPECIFIED: Primary | ICD-10-CM

## 2025-09-02 DIAGNOSIS — Z02.89 ENCOUNTER FOR COMPLETION OF FORM WITH PATIENT: ICD-10-CM

## 2025-09-02 DIAGNOSIS — Z76.89 RETURN TO WORK EVALUATION: ICD-10-CM

## 2025-09-02 PROCEDURE — 99999 PR PBB SHADOW E&M-EST. PATIENT-LVL IV: CPT | Mod: PBBFAC,,,

## 2025-09-02 RX ORDER — IBUPROFEN 800 MG/1
800 TABLET, FILM COATED ORAL DAILY PRN
COMMUNITY